# Patient Record
Sex: FEMALE | Race: WHITE | Employment: OTHER | URBAN - METROPOLITAN AREA
[De-identification: names, ages, dates, MRNs, and addresses within clinical notes are randomized per-mention and may not be internally consistent; named-entity substitution may affect disease eponyms.]

---

## 2019-06-06 RX ORDER — LEVOTHYROXINE SODIUM 137 UG/1
137 TABLET ORAL DAILY
COMMUNITY

## 2019-06-06 RX ORDER — CHOLECALCIFEROL (VITAMIN D3) 50 MCG
2000 TABLET ORAL DAILY
COMMUNITY

## 2019-06-06 RX ORDER — ASCORBIC ACID 125 MG
1 TABLET,CHEWABLE ORAL DAILY
COMMUNITY

## 2019-06-06 ASSESSMENT — MIFFLIN-ST. JEOR: SCORE: 1862.34

## 2019-06-18 ENCOUNTER — HOSPITAL ENCOUNTER (OUTPATIENT)
Facility: CLINIC | Age: 54
Discharge: HOME | End: 2019-06-19
Attending: ORTHOPAEDIC SURGERY | Admitting: ORTHOPAEDIC SURGERY
Payer: COMMERCIAL

## 2019-06-18 ENCOUNTER — ANESTHESIA EVENT (OUTPATIENT)
Dept: SURGERY | Facility: CLINIC | Age: 54
End: 2019-06-18
Payer: COMMERCIAL

## 2019-06-18 ENCOUNTER — ANESTHESIA (OUTPATIENT)
Dept: SURGERY | Facility: CLINIC | Age: 54
End: 2019-06-18
Payer: COMMERCIAL

## 2019-06-18 ENCOUNTER — APPOINTMENT (OUTPATIENT)
Dept: PHYSICAL THERAPY | Facility: CLINIC | Age: 54
End: 2019-06-18
Attending: ORTHOPAEDIC SURGERY
Payer: COMMERCIAL

## 2019-06-18 DIAGNOSIS — Z96.651 TOTAL KNEE REPLACEMENT STATUS, RIGHT: Primary | ICD-10-CM

## 2019-06-18 LAB — HCG UR QL: NEGATIVE

## 2019-06-18 PROCEDURE — 97530 THERAPEUTIC ACTIVITIES: CPT | Mod: GP

## 2019-06-18 PROCEDURE — 37000009 ZZH ANESTHESIA TECHNICAL FEE, EACH ADDTL 15 MIN: Performed by: ORTHOPAEDIC SURGERY

## 2019-06-18 PROCEDURE — 27810169 ZZH OR IMPLANT GENERAL: Performed by: ORTHOPAEDIC SURGERY

## 2019-06-18 PROCEDURE — 25800025 ZZH RX 258: Performed by: ORTHOPAEDIC SURGERY

## 2019-06-18 PROCEDURE — 25000125 ZZHC RX 250: Performed by: NURSE ANESTHETIST, CERTIFIED REGISTERED

## 2019-06-18 PROCEDURE — 25000132 ZZH RX MED GY IP 250 OP 250 PS 637: Performed by: ORTHOPAEDIC SURGERY

## 2019-06-18 PROCEDURE — 25800030 ZZH RX IP 258 OP 636: Performed by: ORTHOPAEDIC SURGERY

## 2019-06-18 PROCEDURE — 71000013 ZZH RECOVERY PHASE 1 LEVEL 1 EA ADDTL HR: Performed by: ORTHOPAEDIC SURGERY

## 2019-06-18 PROCEDURE — 81025 URINE PREGNANCY TEST: CPT | Performed by: ANESTHESIOLOGY

## 2019-06-18 PROCEDURE — 97161 PT EVAL LOW COMPLEX 20 MIN: CPT | Mod: GP

## 2019-06-18 PROCEDURE — 25000125 ZZHC RX 250: Performed by: PHYSICIAN ASSISTANT

## 2019-06-18 PROCEDURE — 27211024 ZZHC OR SUPPLY OTHER OPNP: Performed by: ORTHOPAEDIC SURGERY

## 2019-06-18 PROCEDURE — 97110 THERAPEUTIC EXERCISES: CPT | Mod: GP

## 2019-06-18 PROCEDURE — 40000306 ZZH STATISTIC PRE PROC ASSESS II: Performed by: ORTHOPAEDIC SURGERY

## 2019-06-18 PROCEDURE — 25800030 ZZH RX IP 258 OP 636: Performed by: NURSE ANESTHETIST, CERTIFIED REGISTERED

## 2019-06-18 PROCEDURE — 37000008 ZZH ANESTHESIA TECHNICAL FEE, 1ST 30 MIN: Performed by: ORTHOPAEDIC SURGERY

## 2019-06-18 PROCEDURE — 25000128 H RX IP 250 OP 636: Performed by: PHYSICIAN ASSISTANT

## 2019-06-18 PROCEDURE — 36000093 ZZH SURGERY LEVEL 4 1ST 30 MIN: Performed by: ORTHOPAEDIC SURGERY

## 2019-06-18 PROCEDURE — 71000012 ZZH RECOVERY PHASE 1 LEVEL 1 FIRST HR: Performed by: ORTHOPAEDIC SURGERY

## 2019-06-18 PROCEDURE — 36000063 ZZH SURGERY LEVEL 4 EA 15 ADDTL MIN: Performed by: ORTHOPAEDIC SURGERY

## 2019-06-18 PROCEDURE — 25000125 ZZHC RX 250: Performed by: ORTHOPAEDIC SURGERY

## 2019-06-18 PROCEDURE — 25000128 H RX IP 250 OP 636: Performed by: NURSE ANESTHETIST, CERTIFIED REGISTERED

## 2019-06-18 PROCEDURE — 25000128 H RX IP 250 OP 636: Performed by: ANESTHESIOLOGY

## 2019-06-18 PROCEDURE — 25000128 H RX IP 250 OP 636: Performed by: ORTHOPAEDIC SURGERY

## 2019-06-18 PROCEDURE — 27110028 ZZH OR GENERAL SUPPLY NON-STERILE: Performed by: ORTHOPAEDIC SURGERY

## 2019-06-18 PROCEDURE — 25800030 ZZH RX IP 258 OP 636: Performed by: ANESTHESIOLOGY

## 2019-06-18 PROCEDURE — 27210794 ZZH OR GENERAL SUPPLY STERILE: Performed by: ORTHOPAEDIC SURGERY

## 2019-06-18 PROCEDURE — C1776 JOINT DEVICE (IMPLANTABLE): HCPCS | Performed by: ORTHOPAEDIC SURGERY

## 2019-06-18 DEVICE — BONE CEMENT SIMPLEX FULL DOSE 6191-1-001: Type: IMPLANTABLE DEVICE | Site: KNEE | Status: FUNCTIONAL

## 2019-06-18 DEVICE — IMPLANTABLE DEVICE: Type: IMPLANTABLE DEVICE | Site: KNEE | Status: FUNCTIONAL

## 2019-06-18 DEVICE — BONE CEMENT SIMPLEX W/TOBRAMYCIN 6197-9-001: Type: IMPLANTABLE DEVICE | Site: KNEE | Status: FUNCTIONAL

## 2019-06-18 RX ORDER — LABETALOL 20 MG/4 ML (5 MG/ML) INTRAVENOUS SYRINGE
10
Status: DISCONTINUED | OUTPATIENT
Start: 2019-06-18 | End: 2019-06-18 | Stop reason: HOSPADM

## 2019-06-18 RX ORDER — FENTANYL CITRATE 50 UG/ML
INJECTION, SOLUTION INTRAMUSCULAR; INTRAVENOUS PRN
Status: DISCONTINUED | OUTPATIENT
Start: 2019-06-18 | End: 2019-06-18

## 2019-06-18 RX ORDER — ONDANSETRON 4 MG/1
4 TABLET, ORALLY DISINTEGRATING ORAL EVERY 6 HOURS PRN
Status: DISCONTINUED | OUTPATIENT
Start: 2019-06-18 | End: 2019-06-19 | Stop reason: HOSPADM

## 2019-06-18 RX ORDER — AMOXICILLIN 250 MG
1 CAPSULE ORAL 2 TIMES DAILY
Status: DISCONTINUED | OUTPATIENT
Start: 2019-06-18 | End: 2019-06-19 | Stop reason: HOSPADM

## 2019-06-18 RX ORDER — NALOXONE HYDROCHLORIDE 0.4 MG/ML
.1-.4 INJECTION, SOLUTION INTRAMUSCULAR; INTRAVENOUS; SUBCUTANEOUS
Status: DISCONTINUED | OUTPATIENT
Start: 2019-06-18 | End: 2019-06-19 | Stop reason: HOSPADM

## 2019-06-18 RX ORDER — GLYCOPYRROLATE 0.2 MG/ML
INJECTION, SOLUTION INTRAMUSCULAR; INTRAVENOUS PRN
Status: DISCONTINUED | OUTPATIENT
Start: 2019-06-18 | End: 2019-06-18

## 2019-06-18 RX ORDER — ROPIVACAINE HYDROCHLORIDE 7.5 MG/ML
INJECTION, SOLUTION EPIDURAL; PERINEURAL PRN
Status: DISCONTINUED | OUTPATIENT
Start: 2019-06-18 | End: 2019-06-18

## 2019-06-18 RX ORDER — NALOXONE HYDROCHLORIDE 0.4 MG/ML
.1-.4 INJECTION, SOLUTION INTRAMUSCULAR; INTRAVENOUS; SUBCUTANEOUS
Status: ACTIVE | OUTPATIENT
Start: 2019-06-18 | End: 2019-06-19

## 2019-06-18 RX ORDER — KETOROLAC TROMETHAMINE 15 MG/ML
15 INJECTION, SOLUTION INTRAMUSCULAR; INTRAVENOUS EVERY 6 HOURS PRN
Status: DISCONTINUED | OUTPATIENT
Start: 2019-06-18 | End: 2019-06-19 | Stop reason: HOSPADM

## 2019-06-18 RX ORDER — FENTANYL CITRATE 50 UG/ML
25-50 INJECTION, SOLUTION INTRAMUSCULAR; INTRAVENOUS
Status: DISCONTINUED | OUTPATIENT
Start: 2019-06-18 | End: 2019-06-18 | Stop reason: HOSPADM

## 2019-06-18 RX ORDER — MEPERIDINE HYDROCHLORIDE 50 MG/ML
12.5 INJECTION INTRAMUSCULAR; INTRAVENOUS; SUBCUTANEOUS EVERY 5 MIN PRN
Status: DISCONTINUED | OUTPATIENT
Start: 2019-06-18 | End: 2019-06-18 | Stop reason: HOSPADM

## 2019-06-18 RX ORDER — LEVOTHYROXINE SODIUM 137 UG/1
137 TABLET ORAL DAILY
Status: DISCONTINUED | OUTPATIENT
Start: 2019-06-18 | End: 2019-06-19 | Stop reason: HOSPADM

## 2019-06-18 RX ORDER — ACETAMINOPHEN 325 MG/1
650 TABLET ORAL EVERY 4 HOURS PRN
Status: DISCONTINUED | OUTPATIENT
Start: 2019-06-21 | End: 2019-06-19

## 2019-06-18 RX ORDER — HYDROXYZINE HYDROCHLORIDE 25 MG/1
25 TABLET, FILM COATED ORAL EVERY 6 HOURS PRN
Status: DISCONTINUED | OUTPATIENT
Start: 2019-06-18 | End: 2019-06-19 | Stop reason: HOSPADM

## 2019-06-18 RX ORDER — ONDANSETRON 2 MG/ML
4 INJECTION INTRAMUSCULAR; INTRAVENOUS EVERY 6 HOURS PRN
Status: DISCONTINUED | OUTPATIENT
Start: 2019-06-18 | End: 2019-06-19 | Stop reason: HOSPADM

## 2019-06-18 RX ORDER — LIDOCAINE HYDROCHLORIDE 10 MG/ML
INJECTION, SOLUTION INFILTRATION; PERINEURAL PRN
Status: DISCONTINUED | OUTPATIENT
Start: 2019-06-18 | End: 2019-06-18

## 2019-06-18 RX ORDER — ASCORBIC ACID 125 MG
TABLET,CHEWABLE ORAL DAILY
Status: DISCONTINUED | OUTPATIENT
Start: 2019-06-18 | End: 2019-06-18 | Stop reason: CLARIF

## 2019-06-18 RX ORDER — OXYCODONE HYDROCHLORIDE 5 MG/1
5-10 TABLET ORAL
Status: DISCONTINUED | OUTPATIENT
Start: 2019-06-18 | End: 2019-06-19 | Stop reason: HOSPADM

## 2019-06-18 RX ORDER — HYDROMORPHONE HYDROCHLORIDE 1 MG/ML
.3-.5 INJECTION, SOLUTION INTRAMUSCULAR; INTRAVENOUS; SUBCUTANEOUS EVERY 5 MIN PRN
Status: DISCONTINUED | OUTPATIENT
Start: 2019-06-18 | End: 2019-06-18 | Stop reason: HOSPADM

## 2019-06-18 RX ORDER — SODIUM CHLORIDE, SODIUM LACTATE, POTASSIUM CHLORIDE, CALCIUM CHLORIDE 600; 310; 30; 20 MG/100ML; MG/100ML; MG/100ML; MG/100ML
INJECTION, SOLUTION INTRAVENOUS CONTINUOUS
Status: DISCONTINUED | OUTPATIENT
Start: 2019-06-18 | End: 2019-06-18 | Stop reason: HOSPADM

## 2019-06-18 RX ORDER — ONDANSETRON 2 MG/ML
4 INJECTION INTRAMUSCULAR; INTRAVENOUS EVERY 30 MIN PRN
Status: DISCONTINUED | OUTPATIENT
Start: 2019-06-18 | End: 2019-06-18 | Stop reason: HOSPADM

## 2019-06-18 RX ORDER — CEFAZOLIN SODIUM IN 0.9 % NACL 3 G/100 ML
3 INTRAVENOUS SOLUTION, PIGGYBACK (ML) INTRAVENOUS
Status: COMPLETED | OUTPATIENT
Start: 2019-06-18 | End: 2019-06-18

## 2019-06-18 RX ORDER — DEXAMETHASONE SODIUM PHOSPHATE 4 MG/ML
INJECTION, SOLUTION INTRA-ARTICULAR; INTRALESIONAL; INTRAMUSCULAR; INTRAVENOUS; SOFT TISSUE PRN
Status: DISCONTINUED | OUTPATIENT
Start: 2019-06-18 | End: 2019-06-18

## 2019-06-18 RX ORDER — LIDOCAINE 40 MG/G
CREAM TOPICAL
Status: DISCONTINUED | OUTPATIENT
Start: 2019-06-18 | End: 2019-06-18 | Stop reason: HOSPADM

## 2019-06-18 RX ORDER — SODIUM CHLORIDE, SODIUM LACTATE, POTASSIUM CHLORIDE, CALCIUM CHLORIDE 600; 310; 30; 20 MG/100ML; MG/100ML; MG/100ML; MG/100ML
INJECTION, SOLUTION INTRAVENOUS CONTINUOUS
Status: DISCONTINUED | OUTPATIENT
Start: 2019-06-18 | End: 2019-06-19 | Stop reason: HOSPADM

## 2019-06-18 RX ORDER — PHENYLEPHRINE HYDROCHLORIDE 10 MG/ML
INJECTION INTRAVENOUS PRN
Status: DISCONTINUED | OUTPATIENT
Start: 2019-06-18 | End: 2019-06-18

## 2019-06-18 RX ORDER — CEFAZOLIN SODIUM 1 G/3ML
1 INJECTION, POWDER, FOR SOLUTION INTRAMUSCULAR; INTRAVENOUS SEE ADMIN INSTRUCTIONS
Status: DISCONTINUED | OUTPATIENT
Start: 2019-06-18 | End: 2019-06-18 | Stop reason: HOSPADM

## 2019-06-18 RX ORDER — ONDANSETRON 2 MG/ML
INJECTION INTRAMUSCULAR; INTRAVENOUS PRN
Status: DISCONTINUED | OUTPATIENT
Start: 2019-06-18 | End: 2019-06-18

## 2019-06-18 RX ORDER — LIDOCAINE 40 MG/G
CREAM TOPICAL
Status: DISCONTINUED | OUTPATIENT
Start: 2019-06-18 | End: 2019-06-19 | Stop reason: HOSPADM

## 2019-06-18 RX ORDER — ACETAMINOPHEN 325 MG/1
975 TABLET ORAL EVERY 8 HOURS
Status: DISCONTINUED | OUTPATIENT
Start: 2019-06-18 | End: 2019-06-19

## 2019-06-18 RX ORDER — HYDROMORPHONE HYDROCHLORIDE 1 MG/ML
.3-.5 INJECTION, SOLUTION INTRAMUSCULAR; INTRAVENOUS; SUBCUTANEOUS
Status: DISCONTINUED | OUTPATIENT
Start: 2019-06-18 | End: 2019-06-19 | Stop reason: HOSPADM

## 2019-06-18 RX ORDER — AMOXICILLIN 250 MG
2 CAPSULE ORAL 2 TIMES DAILY
Status: DISCONTINUED | OUTPATIENT
Start: 2019-06-18 | End: 2019-06-19 | Stop reason: HOSPADM

## 2019-06-18 RX ORDER — CEFAZOLIN SODIUM 2 G/100ML
2 INJECTION, SOLUTION INTRAVENOUS EVERY 8 HOURS
Status: DISCONTINUED | OUTPATIENT
Start: 2019-06-18 | End: 2019-06-18

## 2019-06-18 RX ORDER — CEFAZOLIN SODIUM 2 G/100ML
2 INJECTION, SOLUTION INTRAVENOUS EVERY 8 HOURS
Status: COMPLETED | OUTPATIENT
Start: 2019-06-18 | End: 2019-06-19

## 2019-06-18 RX ORDER — ONDANSETRON 4 MG/1
4 TABLET, ORALLY DISINTEGRATING ORAL EVERY 30 MIN PRN
Status: DISCONTINUED | OUTPATIENT
Start: 2019-06-18 | End: 2019-06-18 | Stop reason: HOSPADM

## 2019-06-18 RX ORDER — PROPOFOL 10 MG/ML
INJECTION, EMULSION INTRAVENOUS PRN
Status: DISCONTINUED | OUTPATIENT
Start: 2019-06-18 | End: 2019-06-18

## 2019-06-18 RX ADMIN — TRANEXAMIC ACID 1 G: 100 INJECTION, SOLUTION INTRAVENOUS at 07:44

## 2019-06-18 RX ADMIN — FENTANYL CITRATE 50 MCG: 50 INJECTION, SOLUTION INTRAMUSCULAR; INTRAVENOUS at 08:09

## 2019-06-18 RX ADMIN — HYDROMORPHONE HYDROCHLORIDE 0.5 MG: 1 INJECTION, SOLUTION INTRAMUSCULAR; INTRAVENOUS; SUBCUTANEOUS at 14:14

## 2019-06-18 RX ADMIN — GLYCOPYRROLATE 0.2 MG: 0.2 INJECTION, SOLUTION INTRAMUSCULAR; INTRAVENOUS at 07:33

## 2019-06-18 RX ADMIN — HYDROMORPHONE HYDROCHLORIDE 0.5 MG: 1 INJECTION, SOLUTION INTRAMUSCULAR; INTRAVENOUS; SUBCUTANEOUS at 20:26

## 2019-06-18 RX ADMIN — LIDOCAINE HYDROCHLORIDE 30 MG: 10 INJECTION, SOLUTION INFILTRATION; PERINEURAL at 07:33

## 2019-06-18 RX ADMIN — PROPOFOL 200 MG: 10 INJECTION, EMULSION INTRAVENOUS at 07:33

## 2019-06-18 RX ADMIN — FENTANYL CITRATE 50 MCG: 50 INJECTION, SOLUTION INTRAMUSCULAR; INTRAVENOUS at 08:02

## 2019-06-18 RX ADMIN — FENTANYL CITRATE 50 MCG: 50 INJECTION INTRAMUSCULAR; INTRAVENOUS at 10:24

## 2019-06-18 RX ADMIN — OXYCODONE HYDROCHLORIDE 5 MG: 5 TABLET ORAL at 18:30

## 2019-06-18 RX ADMIN — OXYCODONE HYDROCHLORIDE 5 MG: 5 TABLET ORAL at 21:39

## 2019-06-18 RX ADMIN — PROPOFOL 50 MG: 10 INJECTION, EMULSION INTRAVENOUS at 08:10

## 2019-06-18 RX ADMIN — PHENYLEPHRINE HYDROCHLORIDE 100 MCG: 10 INJECTION INTRAVENOUS at 08:59

## 2019-06-18 RX ADMIN — CEFAZOLIN 1 G: 1 INJECTION, POWDER, FOR SOLUTION INTRAMUSCULAR; INTRAVENOUS at 09:36

## 2019-06-18 RX ADMIN — FENTANYL CITRATE 50 MCG: 50 INJECTION, SOLUTION INTRAMUSCULAR; INTRAVENOUS at 08:08

## 2019-06-18 RX ADMIN — SENNOSIDES AND DOCUSATE SODIUM 1 TABLET: 8.6; 5 TABLET ORAL at 20:28

## 2019-06-18 RX ADMIN — DEXAMETHASONE SODIUM PHOSPHATE 8 MG: 4 INJECTION, SOLUTION INTRA-ARTICULAR; INTRALESIONAL; INTRAMUSCULAR; INTRAVENOUS; SOFT TISSUE at 07:33

## 2019-06-18 RX ADMIN — TRANEXAMIC ACID 1 G: 100 INJECTION, SOLUTION INTRAVENOUS at 09:59

## 2019-06-18 RX ADMIN — PHENYLEPHRINE HYDROCHLORIDE 100 MCG: 10 INJECTION INTRAVENOUS at 07:49

## 2019-06-18 RX ADMIN — HYDROMORPHONE HYDROCHLORIDE 1 MG: 1 INJECTION, SOLUTION INTRAMUSCULAR; INTRAVENOUS; SUBCUTANEOUS at 08:24

## 2019-06-18 RX ADMIN — CEFAZOLIN SODIUM 2 G: 2 INJECTION, SOLUTION INTRAVENOUS at 18:30

## 2019-06-18 RX ADMIN — HYDROMORPHONE HYDROCHLORIDE 0.5 MG: 1 INJECTION, SOLUTION INTRAMUSCULAR; INTRAVENOUS; SUBCUTANEOUS at 08:39

## 2019-06-18 RX ADMIN — ONDANSETRON 4 MG: 2 INJECTION INTRAMUSCULAR; INTRAVENOUS at 09:33

## 2019-06-18 RX ADMIN — SODIUM CHLORIDE, POTASSIUM CHLORIDE, SODIUM LACTATE AND CALCIUM CHLORIDE: 600; 310; 30; 20 INJECTION, SOLUTION INTRAVENOUS at 06:30

## 2019-06-18 RX ADMIN — FENTANYL CITRATE 100 MCG: 50 INJECTION, SOLUTION INTRAMUSCULAR; INTRAVENOUS at 07:33

## 2019-06-18 RX ADMIN — PHENYLEPHRINE HYDROCHLORIDE 100 MCG: 10 INJECTION INTRAVENOUS at 07:57

## 2019-06-18 RX ADMIN — PHENYLEPHRINE HYDROCHLORIDE 100 MCG: 10 INJECTION INTRAVENOUS at 08:47

## 2019-06-18 RX ADMIN — SODIUM CHLORIDE, POTASSIUM CHLORIDE, SODIUM LACTATE AND CALCIUM CHLORIDE: 600; 310; 30; 20 INJECTION, SOLUTION INTRAVENOUS at 14:27

## 2019-06-18 RX ADMIN — FENTANYL CITRATE 50 MCG: 50 INJECTION INTRAMUSCULAR; INTRAVENOUS at 10:35

## 2019-06-18 RX ADMIN — SODIUM CHLORIDE, POTASSIUM CHLORIDE, SODIUM LACTATE AND CALCIUM CHLORIDE: 600; 310; 30; 20 INJECTION, SOLUTION INTRAVENOUS at 09:21

## 2019-06-18 RX ADMIN — HYDROMORPHONE HYDROCHLORIDE 0.5 MG: 1 INJECTION, SOLUTION INTRAMUSCULAR; INTRAVENOUS; SUBCUTANEOUS at 10:55

## 2019-06-18 RX ADMIN — Medication 3 G: at 07:36

## 2019-06-18 RX ADMIN — HYDROMORPHONE HYDROCHLORIDE 0.5 MG: 1 INJECTION, SOLUTION INTRAMUSCULAR; INTRAVENOUS; SUBCUTANEOUS at 10:25

## 2019-06-18 RX ADMIN — HYDROMORPHONE HYDROCHLORIDE 0.5 MG: 1 INJECTION, SOLUTION INTRAMUSCULAR; INTRAVENOUS; SUBCUTANEOUS at 08:32

## 2019-06-18 RX ADMIN — PHENYLEPHRINE HYDROCHLORIDE 100 MCG: 10 INJECTION INTRAVENOUS at 07:44

## 2019-06-18 ASSESSMENT — MIFFLIN-ST. JEOR: SCORE: 1862.34

## 2019-06-18 NOTE — PLAN OF CARE
Discharge Planner PT   Patient plan for discharge: home with oP PT   Current status:      Eval complete, treatment indicated. Edu PT role and POC. SO presenta nd supportive. Sats stable, BP stable. HR dilcia, RN aware. Pt denied dizziness.     Pt engaged in supine TKA exercises. R knee ROM 5-60*. Good quad activation. Pt able to perform SLR.     KI donned for OOB mobility as pt continues to be sleepy this PM. Supine > sit CGA. Pt sat EOB for several minut es to monitor vitals. STS with FWW and CgA.    Pt ambulated 50' with FWW and CGA, slow tasia, step to pattern, KI donned   End of session sit > supine with SBA. Pt left supinew ith CAPNO donned, HR 47-60, RN aware, O2 sats stable. All needs in reach. Ice to R knee    Barriers to return to prior living situation: A x 1, stairs, weakness  Recommendations for discharge: home with assist from spouse, OP PT   Rationale for recommendations: predict pt will progress to modified IND with functional mobility and be able to safely negotiate stairs prior to discharge to home. SO to assist with stairs negotiation. OP PT to progress ROM, strength, gait, balance          Entered by: Caterina Vu 06/18/2019 4:44 PM

## 2019-06-18 NOTE — PLAN OF CARE
Pt arrived from pacu on a cart, assisted into bed with slider board. IV infusing into left arm, dawson cath in place and patent, O2 on at 3L NC, piggybacked into capno. Right leg ace wrapped which was clean and dry, immobilizer in place with ice tucked into it. Very sedated, falling asleep when being talked to and when trying to do postop exercises.  in room with pt and helping remind her to breath, cough and do ankle pumps with each VS check. Oriented to room, equipment, orders and routines. Pt informed she has PT scheduled for 400pm today.

## 2019-06-18 NOTE — ANESTHESIA CARE TRANSFER NOTE
Patient: Rachel Stone    Procedure(s):  Right total knee arthroplasty    Diagnosis: Medial DJD  Diagnosis Additional Information: No value filed.    Anesthesia Type:   General, Periph. Nerve Block for postop pain     Note:  Airway :Face Mask  Patient transferred to:PACU  Comments: Patient with spontaneous respirations and adequate tidal volumes. Patient awake and responsive. LMA removed in OR to 8 L face tent. To PACU ventilating well. VSS. Report given.Handoff Report: Identifed the Patient, Identified the Reponsible Provider, Reviewed the pertinent medical history, Discussed the surgical course, Reviewed Intra-OP anesthesia mangement and issues during anesthesia, Set expectations for post-procedure period and Allowed opportunity for questions and acknowledgement of understanding      Vitals: (Last set prior to Anesthesia Care Transfer)    CRNA VITALS  6/18/2019 0942 - 6/18/2019 1018      6/18/2019             Pulse:  90    SpO2:  94 %                Electronically Signed By: GUILLERMINA Mcgregor CRNA  June 18, 2019  10:18 AM

## 2019-06-18 NOTE — BRIEF OP NOTE
[facility]    Brief Operative Note    Pre-operative diagnosis: Medial DJD  Post-operative diagnosis * No post-op diagnosis entered *  Procedure: Procedure(s):  Right total knee arthroplasty  Surgeon: Surgeon(s) and Role:     * Mary Núñez MD - Primary     * Pablo Colby PA-C - Assisting  Anesthesia: * No anesthesia type entered *   Estimated blood loss: Less than 10 ml  Drains: None  Specimens: * No specimens in log *  Findings:   None.  Complications: None.  Implants:    Implant Name Type Inv. Item Serial No.  Lot No. LRB No. Used   BONE CEMENT SIMPLEX FULL DOSE 6191-1-001 Cement, Bone BONE CEMENT SIMPLEX FULL DOSE 6191-1-001  [employer] [id number] Right 1   BONE CEMENT SIMPLEX W/TOBRAMYCIN 6197-9-001 Cement, Bone BONE CEMENT SIMPLEX W/TOBRAMYCIN 6197-9-001  [employer] [id number] Right 1   Tibial base rotating platform, size 6 cemented Total Joint Component/Insert   Depuy [id number] Right 1   Femoral posterior stabilized narrow, size 6N right cemented Total Joint Component/Insert   Depuy [id number] Right 1   Patella medialized dome, 38mm cemented AOX Total Joint Component/Insert   Depuy [id number] Right 1   Tibial insert, rotating platform, posterior stabilized, size 6, 10mm AOX Total Joint Component/Insert   Depuy [id number] Right 1

## 2019-06-18 NOTE — ANESTHESIA PROCEDURE NOTES
Peripheral nerve/Neuraxial procedure note : Femoral (via adductor canal)  Pre-Procedure  Performed by Anshu Meeks MD  Referred by SOFÍA  Location: pre-op    Procedure Times:6/18/2019 7:03 AM and 6/18/2019 7:12 AM  Pre-Anesthestic Checklist: patient identified, IV checked, site marked, risks and benefits discussed, informed consent, monitors and equipment checked, pre-op evaluation, at physician/surgeon's request and post-op pain management    Timeout  Correct Patient: Yes   Correct Procedure: Yes   Correct Site: Yes   Correct Laterality: Yes   Correct Position: Yes   Site Marked: Yes   .   Procedure Documentation    .    Procedure:  right  Femoral (via adductor canal).     Ultrasound used to identify targeted nerve, plexus, or vascular marker and placed a needle adjacent to it., Ultrasound was used to visualize the spread of the anesthetic in close proximity to the above stated nerve.   Patient Prep;mask, sterile gloves, povidone-iodine 7.5% surgical scrub.  .  Needle: insulated Needle Gauge: 22.    Needle Length (Inches) 2  Insertion Method: Single Shot.       Assessment/Narrative  Paresthesias: No.  Injection made incrementally with aspirations every 5 mL..  The placement was negative for: blood aspirated, painful injection and site bleeding.  Bolus given via needle..   Secured via.   Complications: none. Test dose of mL at. Test dose negative for signs of intravascular, subdural or intrathecal injection. Comments:  The surgeon has given a verbal order transferring care of this patient to me for the performance of a regional analgesia block for post-op pain control. It is requested of me because I am uniquely trained and qualified to perform this block and the surgeon is neither trained nor qualified to perform this procedure.    30 ml 0.5% bupivacaine with 1:200k epi

## 2019-06-18 NOTE — OP NOTE
Procedure Date: 06/18/2019      PREOPERATIVE DIAGNOSES:  Osteoarthritis of the right knee and morbid obesity, with a body mass index of 42.      POSTOPERATIVE DIAGNOSES:  Osteoarthritis of the right knee and morbid obesity, with a body mass index of 42.      PROCEDURES:  Complex right total knee arthroplasty using the DePuy Attune knee system, posterior stabilized rotating platform, #6 narrow femur, #6 tibia, 38 mm patellar button and 10 mm of tibial bone graft.      INDICATIONS FOR PROCEDURE:  The patient is a 54-year-old female who has a long history of osteoarthritis of her right knee.  Also has a history of morbid obesity, with a BMI of 42.  The patient has been getting injection treatment with good short-term relief; however, these have become decreasingly effective.  I saw the patient in consultation and discussed treatment options with her.  I explained to her the risks, benefits, potential complications of total knee arthroplasty.  This discussion included, but was not specific to infection, vascular neurologic complications, DVT, septic and aseptic loosening, arthrofibrosis, fracture and the possible need for further revision surgery.  After this discussion, the patient wanted to proceed with surgery.        PRIMARY ASSISTANT:  Mikey Colby PA-C; secondary scrub was used to help expose the knee.  We used the large abdominal retractors.  We had to put expanders on the bed in order to help safely hold the patient on the operating table.        ANESTHESIA:  General.      DESCRIPTION OF PROCEDURE:  The patient was taken to the operating room, placed on the operative table in supine position.  After adequate induction of a general anesthetic, a pneumatic tourniquet was applied to the right thigh.  A bump was placed beneath the right hip.  The patient was given 3 grams of Ancef for infection prophylaxis given 1 hour prior to incision.  We then performed a sterile prep and drape of the right knee and right lower  extremity.  After sterile prep and drape, the limb was exsanguinated, tourniquet was elevated to 300 mmHg.  I then made a midline incision exposing the extensor mechanism and proceeded with a medial parapatellar arthrotomy, quad splitting approach.  I inspected the 3 compartments of the knee and she had tricompartment arthritis.  So at this point, I decided to proceed with a total knee arthroplasty.  We then identified the entry point for the intramedullary guide of the femur, set it at 5 degrees of valgus and made my distal femoral cut.  We then sized the femur appropriately and applied the jig for 3 degrees of external rotation of the femoral component.  We then made our anterior and posterior cuts along with anterior and posterior chamfers.  We then directed our attention to the tibia.  Using extramedullary guide to establish a neutral cut of the base off of the efficient medial side of the tibia.  In order to correct the varus deformity, a release was done off the medial side of the tibia.  Once the tibial cut was made, we checked our flexion, extension gaps and found them to be equal.  We then finished preparation of the femur by making a box cut and finished the preparation of the tibia.  We then sized the patella, made our patellar cuts, finished preparation of the patella.  We then removed the posterior osteophytes with an osteotome and a mallet and injected the posterior capsule with a mixture of ropivacaine and Toradol.  We then began preparing the cement surfaces using pulsatile jet lavage with antibiotic saline.  Once the cement was of appropriate consistency, we cemented first the tibial component followed by the femoral component.  Once these components were fully seated, excess cement was removed using a [place] elevator.  We then placed the knee in full extension with trial polyethylene in place and allowed the cement to harden.  We then cemented the patella, again removed any excess cement using a  [place] elevator.  While the cement was hardening, we soaked the knee in dilute solution of Betadine for 3 minutes.  We irrigated again using pulsatile jet lavage, used approximately 3 liters of antibiotic saline in the pulsatile jet lavage.  Once the cement had hardened, took the knee through a range of motion.  Patella tracked ideally.  We had good soft tissue balance in flexion and extension.  We then removed the trial polyethylene, inspected the joint for loose bone cement and removed it when it was found.  We then irrigated again using the pulsatile jet lavage and put in the actual rotating platform tibial polyethylene, reduced the knee, again took the knee through a range of motion.  Patella tracked ideally.  We then placed the knee in approximately 30 degrees of flexion and closed the arthrotomy using 0 Ethibond sutures.  Prior to closure, we did irrigate using pulsatile jet lavage.  The 0 Ethibond closure was then oversewn with 0 Stratafix.  The deep subQ was then closed using multiple layers using 0 Vicryl, superficial was closed in multiple layers using 2-0 Vicryl, skin was closed with a running 3-0 Monocryl subcuticular stitch followed by Prineo dressing.  Sterile dressing was applied followed by a knee immobilizer.  The patient tolerated the operative procedure.  There were no intraoperative complications.  The patient went to the PAR in stable condition.  Plan will be for the patient to get 24 hours of Ancef for infection prophylaxis, 30 days of aspirin for DVT prophylaxis.         FINA GORE MD             D: 2019   T: 2019   MT: URBANO      Name:     LEE NAIDU   MRN:      [id number]        Account:        [id number]   :      1965           Procedure Date: 2019      Document: [id number]

## 2019-06-18 NOTE — ANESTHESIA POSTPROCEDURE EVALUATION
Patient: Rachel Stone    Procedure(s):  Right total knee arthroplasty    Diagnosis:Medial DJD  Diagnosis Additional Information: No value filed.    Anesthesia Type:  General, Periph. Nerve Block for postop pain    Note:  Anesthesia Post Evaluation    Patient location during evaluation: PACU  Patient participation: Able to participate in evaluation but full recovery from regional anesthesia has not yet ocurrred but is anticipated to occur within 48 hours  Level of consciousness: awake and alert  Pain management: adequate  Airway patency: patent  Cardiovascular status: acceptable  Respiratory status: acceptable  Hydration status: acceptable  PONV: none     Anesthetic complications: None          Last vitals:  Vitals:    06/18/19 1110 06/18/19 1115 06/18/19 1133   BP:   (P) 107/41   Pulse:      Resp: 10 12 (P) 10   Temp:   (P) 96  F (35.6  C)   SpO2:   (P) 92%         Electronically Signed By: Anshu Meeks MD  June 18, 2019  11:43 AM

## 2019-06-18 NOTE — ANESTHESIA PREPROCEDURE EVALUATION
Anesthesia Pre-Procedure Evaluation    Patient: Rachel Stone   MRN: [id number] : 1965          Preoperative Diagnosis: Medial DJD    Procedure(s):  Right unicompartmental knee arthroplasty versus total knee arthroplasty    Past Medical History:   Diagnosis Date     Arthritis     OA knee     Thyroid disease     hypothyroidism     Past Surgical History:   Procedure Laterality Date     ENT SURGERY      tonsillectomy as a child     GYN SURGERY      C/S     HEAD & NECK SURGERY      wisdom teeth removed age 21     ORTHOPEDIC SURGERY Right 2014    meniscus repair knee arthroscopy     Anesthesia Evaluation     . Pt has had prior anesthetic. Type: General    No history of anesthetic complications          ROS/MED HX    ENT/Pulmonary:  - neg pulmonary ROS     Neurologic:  - neg neurologic ROS     Cardiovascular:     (+) hypertension----. : . . . :. .       METS/Exercise Tolerance:     Hematologic:  - neg hematologic  ROS       Musculoskeletal:   (+) arthritis,  -       GI/Hepatic:  - neg GI/hepatic ROS       Renal/Genitourinary:  - ROS Renal section negative       Endo:     (+) thyroid problem hypothyroidism, Obesity, .      Psychiatric:  - neg psychiatric ROS       Infectious Disease:  - neg infectious disease ROS       Malignancy:         Other:    - neg other ROS                      Physical Exam  Normal systems: cardiovascular, pulmonary and dental    Airway   Mallampati: II  TM distance: >3 FB  Neck ROM: full    Dental     Cardiovascular       Pulmonary             No results found for: WBC, HGB, HCT, PLT, CRP, SED, NA, POTASSIUM, CHLORIDE, CO2, BUN, CR, GLC, JERRELL, PHOS, MAG, ALBUMIN, PROTTOTAL, ALT, AST, GGT, ALKPHOS, BILITOTAL, BILIDIRECT, LIPASE, AMYLASE, FEDE, PTT, INR, FIBR, TSH, T4, T3, HCG, HCGS, CKTOTAL, CKMB, TROPN    Preop Vitals  BP Readings from Last 3 Encounters:   19 123/70    Pulse Readings from Last 3 Encounters:   19 71      Resp Readings from Last 3 Encounters:   19 18     "SpO2 Readings from Last 3 Encounters:   06/18/19 98%      Temp Readings from Last 1 Encounters:   06/18/19 97.7  F (36.5  C) (Temporal)    Ht Readings from Last 1 Encounters:   06/18/19 1.702 m (5' 7\")      Wt Readings from Last 1 Encounters:   06/18/19 122.5 kg (270 lb)    Estimated body mass index is 42.29 kg/m  as calculated from the following:    Height as of this encounter: 1.702 m (5' 7\").    Weight as of this encounter: 122.5 kg (270 lb).       Anesthesia Plan      History & Physical Review  History and physical reviewed and following examination; no interval change.    ASA Status:  3 .    NPO Status:  > 8 hours    Plan for General and Periph. Nerve Block for postop pain with Intravenous induction. Maintenance will be Balanced.    PONV prophylaxis:  Ondansetron (or other 5HT-3) and Dexamethasone or Solumedrol       Postoperative Care  Postoperative pain management:  IV analgesics, Oral pain medications and Multi-modal analgesia.      Consents  Anesthetic plan, risks, benefits and alternatives discussed with:  Patient..                 Anshu Meeks MD                    .  "

## 2019-06-18 NOTE — PHARMACY-ADMISSION MEDICATION HISTORY
Medication reconciliation interview completed by pre-admitting nurse Shira Ryan, reviewed by pharmacy. No further clarifications needed.       Prior to Admission medications    Medication Sig Last Dose Taking? Auth Provider   Cholecalciferol (VITAMIN D3) 2000 units TABS Take by mouth daily  Yes Reported, Patient   levothyroxine (SYNTHROID/LEVOTHROID) 137 MCG tablet Take 137 mcg by mouth daily  Yes Reported, Patient   Menaquinone-7 (VITAMIN K2) 100 MCG CAPS Take by mouth daily  Yes Reported, Patient

## 2019-06-18 NOTE — PROGRESS NOTES
06/18/19 1636   Quick Adds   Type of Visit Initial PT Evaluation   Living Environment   Lives With spouse   Living Arrangements house   Living Environment Comment 12 steps to bedroom, railing present. Has basement, does not need to access. Tub shower   Self-Care   Usual Activity Tolerance good   Current Activity Tolerance fair   Regular Exercise No   Equipment Currently Used at Home none   Activity/Exercise/Self-Care Comment IND   Functional Level Prior   Ambulation 0-->independent   Transferring 0-->independent   Toileting 0-->independent   Bathing 0-->independent   Communication 0-->understands/communicates without difficulty   Swallowing 0-->swallows foods/liquids without difficulty   Fall history within last six months no   Prior Functional Level Comment IND   General Information   Onset of Illness/Injury or Date of Surgery - Date 06/18/19   Referring Physician Mary Núñez MD   Pertinent History of Current Problem (include personal factors and/or comorbidities that impact the POC) POD # 0 R TKA   Precautions/Limitations fall precautions   General Observations Pt somewhat lethargic start of session, noted pt with dilcia HR, RN aware, O2 sats stable during session    General Info Comments SO present and supportive   Cognitive Status Examination   Orientation orientation to person, place and time   Cognitive Comment Pt sleepy however able to participate in session    Pain Assessment   Patient Currently in Pain Yes, see Vital Sign flowsheet   Posture    Posture Forward head position   Range of Motion (ROM)   ROM Comment R knee AROM 0-60* supine    Strength   Strength Comments Good antigravity strength RLE, pt able to perform SLR no quad lag    Bed Mobility   Bed Mobility Comments supine > sit with cGA   Transfer Skills   Transfer Comments STS with FWW and close CGA KI donned   Gait   Gait Comments Pt ambulated 50' with FWW and CGA, slow tasia, step to pattern, KI donned   Balance   Balance Comments Pt  "requires BuE support on FWw to maintain standing balance   Sensory Examination   Sensory Perception Comments Intac t to light touch    General Therapy Interventions   Planned Therapy Interventions balance training;bed mobility training;gait training;neuromuscular re-education;strengthening;transfer training   Clinical Impression   Criteria for Skilled Therapeutic Intervention yes, treatment indicated   PT Diagnosis impaired functional mobility from baseline   Influenced by the following impairments POD # 0 R TKA, pain, weakness    Functional limitations due to impairments see above   Clinical Presentation Stable/Uncomplicated   Clinical Presentation Rationale age, good family support, mobilizing well   Clinical Decision Making (Complexity) Low complexity   Therapy Frequency 2x/day   Predicted Duration of Therapy Intervention (days/wks) 3 days   Anticipated Discharge Disposition Home with Outpatient Therapy   Risk & Benefits of therapy have been explained Yes   Patient, Family & other staff in agreement with plan of care Yes   [facility] AM-PAC  \"6 Clicks\" V.2 Basic Mobility Inpatient Short Form   1. Turning from your back to your side while in a flat bed without using bedrails? 3 - A Little   2. Moving from lying on your back to sitting on the side of a flat bed without using bedrails? 3 - A Little   3. Moving to and from a bed to a chair (including a wheelchair)? 3 - A Little   4. Standing up from a chair using your arms (e.g., wheelchair, or bedside chair)? 3 - A Little   5. To walk in hospital room? 3 - A Little   6. Climbing 3-5 steps with a railing? 3 - A Little   Basic Mobility Raw Score (Score out of 24.Lower scores equate to lower levels of function) 18   Total Evaluation Time   Total Evaluation Time (Minutes) 18     "

## 2019-06-19 ENCOUNTER — APPOINTMENT (OUTPATIENT)
Dept: OCCUPATIONAL THERAPY | Facility: CLINIC | Age: 54
End: 2019-06-19
Attending: ORTHOPAEDIC SURGERY
Payer: COMMERCIAL

## 2019-06-19 ENCOUNTER — APPOINTMENT (OUTPATIENT)
Dept: PHYSICAL THERAPY | Facility: CLINIC | Age: 54
End: 2019-06-19
Attending: ORTHOPAEDIC SURGERY
Payer: COMMERCIAL

## 2019-06-19 VITALS
RESPIRATION RATE: 14 BRPM | SYSTOLIC BLOOD PRESSURE: 121 MMHG | WEIGHT: 270 LBS | HEART RATE: 59 BPM | TEMPERATURE: 97.5 F | HEIGHT: 67 IN | BODY MASS INDEX: 42.38 KG/M2 | DIASTOLIC BLOOD PRESSURE: 52 MMHG | OXYGEN SATURATION: 100 %

## 2019-06-19 LAB
GLUCOSE SERPL-MCNC: 141 MG/DL (ref 70–99)
HGB BLD-MCNC: 10.4 G/DL (ref 11.7–15.7)

## 2019-06-19 PROCEDURE — 97110 THERAPEUTIC EXERCISES: CPT | Mod: GP | Performed by: PHYSICAL THERAPY ASSISTANT

## 2019-06-19 PROCEDURE — 85018 HEMOGLOBIN: CPT | Performed by: ORTHOPAEDIC SURGERY

## 2019-06-19 PROCEDURE — 97530 THERAPEUTIC ACTIVITIES: CPT | Mod: GP | Performed by: PHYSICAL THERAPY ASSISTANT

## 2019-06-19 PROCEDURE — 97165 OT EVAL LOW COMPLEX 30 MIN: CPT | Mod: GO | Performed by: STUDENT IN AN ORGANIZED HEALTH CARE EDUCATION/TRAINING PROGRAM

## 2019-06-19 PROCEDURE — 97535 SELF CARE MNGMENT TRAINING: CPT | Mod: GO,59 | Performed by: STUDENT IN AN ORGANIZED HEALTH CARE EDUCATION/TRAINING PROGRAM

## 2019-06-19 PROCEDURE — 97116 GAIT TRAINING THERAPY: CPT | Mod: GP | Performed by: PHYSICAL THERAPY ASSISTANT

## 2019-06-19 PROCEDURE — 25000128 H RX IP 250 OP 636: Performed by: ORTHOPAEDIC SURGERY

## 2019-06-19 PROCEDURE — 25000132 ZZH RX MED GY IP 250 OP 250 PS 637: Performed by: ORTHOPAEDIC SURGERY

## 2019-06-19 PROCEDURE — 82947 ASSAY GLUCOSE BLOOD QUANT: CPT | Performed by: ORTHOPAEDIC SURGERY

## 2019-06-19 PROCEDURE — 36415 COLL VENOUS BLD VENIPUNCTURE: CPT | Performed by: ORTHOPAEDIC SURGERY

## 2019-06-19 RX ORDER — ONDANSETRON 4 MG/1
4 TABLET, ORALLY DISINTEGRATING ORAL EVERY 6 HOURS PRN
Qty: 30 TABLET | Refills: 0 | Status: ON HOLD | OUTPATIENT
Start: 2019-06-19 | End: 2019-11-20

## 2019-06-19 RX ORDER — HYDROXYZINE HYDROCHLORIDE 25 MG/1
25 TABLET, FILM COATED ORAL EVERY 6 HOURS PRN
Qty: 30 TABLET | Refills: 0 | Status: ON HOLD | OUTPATIENT
Start: 2019-06-19 | End: 2019-11-20

## 2019-06-19 RX ORDER — ASPIRIN 325 MG
325 TABLET, DELAYED RELEASE (ENTERIC COATED) ORAL DAILY
Qty: 30 TABLET | Refills: 0 | Status: ON HOLD | OUTPATIENT
Start: 2019-06-20 | End: 2019-11-20

## 2019-06-19 RX ORDER — AMOXICILLIN 250 MG
1 CAPSULE ORAL 2 TIMES DAILY
Qty: 30 TABLET | Refills: 0 | Status: ON HOLD | OUTPATIENT
Start: 2019-06-19 | End: 2019-11-20

## 2019-06-19 RX ORDER — OXYCODONE HYDROCHLORIDE 5 MG/1
5-10 TABLET ORAL
Qty: 30 TABLET | Refills: 0 | Status: ON HOLD | OUTPATIENT
Start: 2019-06-19 | End: 2019-11-20

## 2019-06-19 RX ADMIN — HYDROXYZINE HYDROCHLORIDE 25 MG: 25 TABLET ORAL at 09:03

## 2019-06-19 RX ADMIN — SENNOSIDES AND DOCUSATE SODIUM 1 TABLET: 8.6; 5 TABLET ORAL at 07:49

## 2019-06-19 RX ADMIN — LEVOTHYROXINE SODIUM 137 MCG: 137 TABLET ORAL at 07:31

## 2019-06-19 RX ADMIN — VITAMIN D, TAB 1000IU (100/BT) 2000 UNITS: 25 TAB at 07:49

## 2019-06-19 RX ADMIN — CEFAZOLIN SODIUM 2 G: 2 INJECTION, SOLUTION INTRAVENOUS at 02:30

## 2019-06-19 RX ADMIN — OXYCODONE HYDROCHLORIDE 5 MG: 5 TABLET ORAL at 09:03

## 2019-06-19 RX ADMIN — OXYCODONE HYDROCHLORIDE 5 MG: 5 TABLET ORAL at 02:35

## 2019-06-19 RX ADMIN — ASPIRIN 325 MG: 325 TABLET, DELAYED RELEASE ORAL at 07:49

## 2019-06-19 RX ADMIN — KETOROLAC TROMETHAMINE 15 MG: 15 INJECTION, SOLUTION INTRAMUSCULAR; INTRAVENOUS at 04:00

## 2019-06-19 RX ADMIN — OXYCODONE HYDROCHLORIDE 10 MG: 5 TABLET ORAL at 11:50

## 2019-06-19 RX ADMIN — OXYCODONE HYDROCHLORIDE 5 MG: 5 TABLET ORAL at 06:09

## 2019-06-19 ASSESSMENT — ACTIVITIES OF DAILY LIVING (ADL): PREVIOUS_RESPONSIBILITIES: MEAL PREP;HOUSEKEEPING;LAUNDRY;SHOPPING;FINANCES;DRIVING

## 2019-06-19 NOTE — PLAN OF CARE
OT: Evaluation completed, treatment initiated, POD#1 R TKA      Discharge Planner OT   Patient plan for discharge: home today  Current status: Pt able to complete total body dressing tasks seated and standing from chair, SBA following instructions for compensatory techniques; pt able to complete sit<>stand transfers with FWW, SBA and mobility within room with FWW, SBA, at times demonstrated poor FWW safety and impulsivity, however receptive to instructions for improved safety; pt completed toilet transfer from comfort height toilet, FWW, SBA following instructions to simulate home set-up  Barriers to return to prior living situation: none anticipated  Recommendations for discharge: home with family, may benefit from DME such as RTS and shower chair  Rationale for recommendations: pt demonstrated safety with self cares including dressing tasks and elevated toilet transfer, pt declined tub or standard height toilet transfer, if remains in hospital could complete transfers otherwise adequate for discharge home with possible DME and family assist       Entered by: Yvonne Casillas 06/19/2019 11:21 AM

## 2019-06-19 NOTE — PROGRESS NOTES
"Rachel Stone  2019  POD # 1 sp tka    Admit Date:  2019      Doing well.  Normal healing wound.  No immediate surgical complications identified.  No excessive bleeding  Pain well-controlled.  Objective:  Blood pressure 110/42, pulse 59, temperature 97  F (36.1  C), resp. rate 15, height 1.702 m (5' 7\"), weight 122.5 kg (270 lb), last menstrual period 2019, SpO2 100 %.    Temperatures:  Current - Temp: 97  F (36.1  C); Max - Temp  Av  F (36.1  C)  Min: 96  F (35.6  C)  Max: 98  F (36.7  C)  Pulse range: Pulse  Av.4  Min: 57  Max: 89  Blood pressure range: Systolic (24hrs), Av , Min:93 , Max:127   ; Diastolic (24hrs), Av, Min:41, Max:74    Exam:  CMS: intact  alert, stable, wound ok  Calf nontender b le.       Labs:  No results for input(s): POTASSIUM in the last 33793 hours.  Recent Labs   Lab Test 19  0654   HGB 10.4*     No results for input(s): INR in the last 50784 hours.  No results for input(s): PLT in the last 18369 hours.    PLAN: Weight bearing as tolerated  Continue physical therapy  Pain control measures  Discontinue to home today.      "

## 2019-06-19 NOTE — PLAN OF CARE
Pt remains very sleepy. VSS. 2L O2 Reports Numbness to R foot. Ace wrap to R leg CDI, ice applied. Reg diet. Aguilar in place. Ambulated in xiao with PT, A1 walker, gait belt and KI. Pt states she does not do well with medication and would prefer not to take anything. Pt rating pain 4-6/10. Did request 1 oxy for pain this evening, pt tolerated well and noted decrease in pain. Pt plans to go home with  at discharge. Will continue to monitor.

## 2019-06-19 NOTE — DISCHARGE INSTRUCTIONS
Return to clinic in 10-14 days.  Call [phone] to schedule or if you experience any problems before your scheduled appointment.      1. Do exercises at home as instructed by therapist twice a day. Start outpatient therapy as ordered by your doctor.  2. Ice knee after exercises and therapy.  3. Examine dressing daily for problems.  4. May shower, can get dressing wet, no soaking (no bathtubs, pools or hot tubs)  5. Notify your dentist or physician of your implant so you can get antibiotics before any dental work or before any invasive procedure (ie: colonoscopy)  6. Remove anti-embolism stockings (Mann hose) at bedtime and wear during the day.      While on narcotic pain medication, to prevent constipation:  1. Drink plenty of water to keep well hydrated   2. May take over the counter Colace or Senna (follow instructions on label)        Call your physician if: ([phone])   1. Persistent fever greater than 101 degrees with body chills or excessive sweating.  2. Increased/persistent redness, localized warmth, tenderness, drainage or swelling at incision site. Greater than 50% drainage on dressing.   3. Persistent pain not controlled with oral pain medications, ice and rest.   4. No bowel movement in 3 days (may use Milk of Magnesia or other over the counter remedy).  5. Chest pain, shortness of breath, and/or calf pain with excessive swelling.  6. Generalized feeling of illness such as nausea/vomiting and/or lightheaded/dizziness.  7. Any other questions or concerns related to your surgery/recovery.    Thank you for allowing [facility] to participate in your cares!!

## 2019-06-19 NOTE — PLAN OF CARE
Discharge Planner PT   Patient plan for discharge: home later today  Current status: gait with RW to 125 feet stairs not tried will do in PM session wants RW issued S basic transfers  Barriers to return to prior living situation: none  Recommendations for discharge: home with assist from spouse, OP PT  per plan established by the PT.    Rationale for recommendations: anticipate will met PT goals after PM session will issueRW       Entered by: Doreen Teresa 06/19/2019 10:30 AM     PT- PM issued RW for home goals were met, recommend to PT for discharge to home with OP PT per MD plan.

## 2019-06-19 NOTE — PLAN OF CARE
A&O x4. Up w/Ax1 w/walker and KI. On 2L O2 w/capnography. BS hypoactive, denies passing flatus. Tolerating regular diet well. Aguilar drained adequate amts, removed this am. CMS intact. Saline locked this am. Right knee ace wrapped CDI. Pain managed w/PRN oxycodone, IV Dilaudid x1, and IV Toradol x1. Plans for home w/spouse at discharge.

## 2019-06-19 NOTE — DISCHARGE SUMMARY
Orthopedic Discharge Summary   Patient: Rachel Stone  Admission Date: 6/18/2019  Discharge Date: 6/19/19  Date of Service: 6/19/2019  Attending Provider: Mary Núñez MD  Admission Diagnosis: Medial DJD  Total knee replacement status, right  Total knee replacement status, right  Discharge Diagnosis: right knee osteoarthritis  Procedures Performed: right total knee replacement  Complications: None apparent   History of Present Illness: see operative report for full HPI  Past Medical History:   Past Medical History:   Diagnosis Date     Arthritis     OA knee     Thyroid disease     hypothyroidism     Patient Active Problem List   Diagnosis     Total knee replacement status, right     Past Surgical History:   Procedure Laterality Date     ENT SURGERY      tonsillectomy as a child     GYN SURGERY  2001    C/S     HEAD & NECK SURGERY      wisdom teeth removed age 21     ORTHOPEDIC SURGERY Right 2014    meniscus repair knee arthroscopy     Social History     Socioeconomic History     Marital status:      Spouse name: Not on file     Number of children: Not on file     Years of education: Not on file     Highest education level: Not on file   Occupational History     Not on file   Social Needs     Financial resource strain: Not on file     Food insecurity:     Worry: Not on file     Inability: Not on file     Transportation needs:     Medical: Not on file     Non-medical: Not on file   Tobacco Use     Smoking status: Never Smoker     Smokeless tobacco: Never Used   Substance and Sexual Activity     Alcohol use: Yes     Comment: 1-2 per month      Drug use: Never     Sexual activity: Not on file   Lifestyle     Physical activity:     Days per week: Not on file     Minutes per session: Not on file     Stress: Not on file   Relationships     Social connections:     Talks on phone: Not on file     Gets together: Not on file     Attends [employer] service: Not on file     Active member of club or organization: Not on  file     Attends meetings of clubs or organizations: Not on file     Relationship status: Not on file     Intimate partner violence:     Fear of current or ex partner: Not on file     Emotionally abused: Not on file     Physically abused: Not on file     Forced sexual activity: Not on file   Other Topics Concern     Not on file   Social History Narrative     Not on file     Medications on admission:   Medications Prior to Admission   Medication Sig Dispense Refill Last Dose     Cholecalciferol (VITAMIN D3) 2000 units TABS Take by mouth daily        levothyroxine (SYNTHROID/LEVOTHROID) 137 MCG tablet Take 137 mcg by mouth daily        Menaquinone-7 (VITAMIN K2) 100 MCG CAPS Take by mouth daily        Allergies:    Allergies   Allergen Reactions     Apple Swelling     Hives and possible swelling of throat     [place] Oil [Fish Oil] Swelling     Hives and swelling of throat     Acetaminophen Hives     Itching and hives     Ibuprofen Hives     Itching and hives       Hospital Course: Patient was admitted to Orthopedics and brought to the OR on where she underwent the above named procedure. Postoperatively she did very well with no apparent complications, and she had an uneventful hospital stay. Ancef was given for 24 hours after surgery. She was given aspirin for DVT prophylaxis and her pain was well controlled with oral medications, then transitioned to oral pain medications during her hospital stay. She progressed well with physical therapy and discharge to home was recommended. Her chronic medical problems were followed by the medicine team during her hospital stay and there were no significant changes to conditions or treatment plans. No new medical problems presented during her hospital stay.   Consultations Obtained During Hospitalization:  1. Internal medicine for management of comorbid conditions and home medication management.  2. Physical Therapy for gait training, mobilization, range of motion and  strengthening exercises  3. Occupational Therapy for activities of daily living.  4. Social Work for disposition planning.  Active Problems:    Total knee replacement status, right    Discharge Disposition: patient improving  Discharge Diet: resume normal pre op diet  Discharge Medications:   Current Discharge Medication List      START taking these medications    Details   aspirin (ASA) 325 MG EC tablet Take 1 tablet (325 mg) by mouth daily  Qty: 30 tablet, Refills: 0    Associated Diagnoses: Total knee replacement status, right      hydrOXYzine (ATARAX) 25 MG tablet Take 1 tablet (25 mg) by mouth every 6 hours as needed for itching  Qty: 30 tablet, Refills: 0    Associated Diagnoses: Total knee replacement status, right      ondansetron (ZOFRAN-ODT) 4 MG ODT tab Take 1 tablet (4 mg) by mouth every 6 hours as needed for nausea or vomiting  Qty: 30 tablet, Refills: 0    Associated Diagnoses: Total knee replacement status, right      oxyCODONE (ROXICODONE) 5 MG tablet Take 1-2 tablets (5-10 mg) by mouth every 3 hours as needed for breakthrough pain  Qty: 30 tablet, Refills: 0    Associated Diagnoses: Total knee replacement status, right      senna-docusate (SENOKOT-S/PERICOLACE) 8.6-50 MG tablet Take 1 tablet by mouth 2 times daily  Qty: 30 tablet, Refills: 0    Associated Diagnoses: Total knee replacement status, right         CONTINUE these medications which have NOT CHANGED    Details   Cholecalciferol (VITAMIN D3) 2000 units TABS Take by mouth daily      levothyroxine (SYNTHROID/LEVOTHROID) 137 MCG tablet Take 137 mcg by mouth daily      Menaquinone-7 (VITAMIN K2) 100 MCG CAPS Take by mouth daily           Code Status:   X-rays needed at the follow up visit: =  Pablo Colby PA-C  6/19/2019 10:33 AM   [facility]  [place]  [phone]  Please fax copy to [facility] at Dr. Núñez's office.

## 2019-11-20 ENCOUNTER — ANESTHESIA (OUTPATIENT)
Dept: SURGERY | Facility: CLINIC | Age: 54
End: 2019-11-20
Payer: COMMERCIAL

## 2019-11-20 ENCOUNTER — HOSPITAL ENCOUNTER (OUTPATIENT)
Facility: CLINIC | Age: 54
LOS: 1 days | Discharge: HOME | End: 2019-11-21
Attending: ORTHOPAEDIC SURGERY | Admitting: ORTHOPAEDIC SURGERY
Payer: COMMERCIAL

## 2019-11-20 ENCOUNTER — ANESTHESIA EVENT (OUTPATIENT)
Dept: SURGERY | Facility: CLINIC | Age: 54
End: 2019-11-20
Payer: COMMERCIAL

## 2019-11-20 ENCOUNTER — APPOINTMENT (OUTPATIENT)
Dept: PHYSICAL THERAPY | Facility: CLINIC | Age: 54
End: 2019-11-20
Attending: ORTHOPAEDIC SURGERY
Payer: COMMERCIAL

## 2019-11-20 DIAGNOSIS — Z96.652 S/P LEFT UNICOMPARTMENTAL KNEE REPLACEMENT: Primary | ICD-10-CM

## 2019-11-20 DIAGNOSIS — M17.12 DEGENERATIVE ARTHRITIS OF LEFT KNEE: ICD-10-CM

## 2019-11-20 LAB — HCG UR QL: NEGATIVE

## 2019-11-20 PROCEDURE — 36000063 ZZH SURGERY LEVEL 4 EA 15 ADDTL MIN: Performed by: ORTHOPAEDIC SURGERY

## 2019-11-20 PROCEDURE — 25000128 H RX IP 250 OP 636: Performed by: NURSE ANESTHETIST, CERTIFIED REGISTERED

## 2019-11-20 PROCEDURE — 25800030 ZZH RX IP 258 OP 636: Performed by: NURSE ANESTHETIST, CERTIFIED REGISTERED

## 2019-11-20 PROCEDURE — 25800030 ZZH RX IP 258 OP 636: Performed by: ORTHOPAEDIC SURGERY

## 2019-11-20 PROCEDURE — 25800030 ZZH RX IP 258 OP 636: Performed by: PHYSICIAN ASSISTANT

## 2019-11-20 PROCEDURE — 37000008 ZZH ANESTHESIA TECHNICAL FEE, 1ST 30 MIN: Performed by: ORTHOPAEDIC SURGERY

## 2019-11-20 PROCEDURE — 25800030 ZZH RX IP 258 OP 636: Performed by: ANESTHESIOLOGY

## 2019-11-20 PROCEDURE — 40000170 ZZH STATISTIC PRE-PROCEDURE ASSESSMENT II: Performed by: ORTHOPAEDIC SURGERY

## 2019-11-20 PROCEDURE — 25000566 ZZH SEVOFLURANE, EA 15 MIN: Performed by: ORTHOPAEDIC SURGERY

## 2019-11-20 PROCEDURE — 37000009 ZZH ANESTHESIA TECHNICAL FEE, EACH ADDTL 15 MIN: Performed by: ORTHOPAEDIC SURGERY

## 2019-11-20 PROCEDURE — 97110 THERAPEUTIC EXERCISES: CPT | Mod: GP

## 2019-11-20 PROCEDURE — 25800025 ZZH RX 258: Performed by: ORTHOPAEDIC SURGERY

## 2019-11-20 PROCEDURE — 97161 PT EVAL LOW COMPLEX 20 MIN: CPT | Mod: GP

## 2019-11-20 PROCEDURE — 25000128 H RX IP 250 OP 636: Performed by: PHYSICIAN ASSISTANT

## 2019-11-20 PROCEDURE — 36000093 ZZH SURGERY LEVEL 4 1ST 30 MIN: Performed by: ORTHOPAEDIC SURGERY

## 2019-11-20 PROCEDURE — 25000132 ZZH RX MED GY IP 250 OP 250 PS 637: Performed by: PHYSICIAN ASSISTANT

## 2019-11-20 PROCEDURE — 25000128 H RX IP 250 OP 636: Performed by: ANESTHESIOLOGY

## 2019-11-20 PROCEDURE — C1776 JOINT DEVICE (IMPLANTABLE): HCPCS | Performed by: ORTHOPAEDIC SURGERY

## 2019-11-20 PROCEDURE — 27110028 ZZH OR GENERAL SUPPLY NON-STERILE: Performed by: ORTHOPAEDIC SURGERY

## 2019-11-20 PROCEDURE — 27210794 ZZH OR GENERAL SUPPLY STERILE: Performed by: ORTHOPAEDIC SURGERY

## 2019-11-20 PROCEDURE — 25000125 ZZHC RX 250: Performed by: NURSE ANESTHETIST, CERTIFIED REGISTERED

## 2019-11-20 PROCEDURE — 97116 GAIT TRAINING THERAPY: CPT | Mod: GP

## 2019-11-20 PROCEDURE — 25000125 ZZHC RX 250: Performed by: PHYSICIAN ASSISTANT

## 2019-11-20 PROCEDURE — 71000012 ZZH RECOVERY PHASE 1 LEVEL 1 FIRST HR: Performed by: ORTHOPAEDIC SURGERY

## 2019-11-20 PROCEDURE — 25000128 H RX IP 250 OP 636: Performed by: ORTHOPAEDIC SURGERY

## 2019-11-20 PROCEDURE — 27810169 ZZH OR IMPLANT GENERAL: Performed by: ORTHOPAEDIC SURGERY

## 2019-11-20 PROCEDURE — 81025 URINE PREGNANCY TEST: CPT | Performed by: ANESTHESIOLOGY

## 2019-11-20 DEVICE — IMPLANTABLE DEVICE: Type: IMPLANTABLE DEVICE | Site: KNEE | Status: FUNCTIONAL

## 2019-11-20 DEVICE — BONE CEMENT SIMPLEX W/TOBRAMYCIN 6197-9-001: Type: IMPLANTABLE DEVICE | Site: KNEE | Status: FUNCTIONAL

## 2019-11-20 RX ORDER — SODIUM CHLORIDE, SODIUM LACTATE, POTASSIUM CHLORIDE, CALCIUM CHLORIDE 600; 310; 30; 20 MG/100ML; MG/100ML; MG/100ML; MG/100ML
INJECTION, SOLUTION INTRAVENOUS CONTINUOUS
Status: DISCONTINUED | OUTPATIENT
Start: 2019-11-20 | End: 2019-11-20 | Stop reason: HOSPADM

## 2019-11-20 RX ORDER — CEFAZOLIN SODIUM 1 G/3ML
1 INJECTION, POWDER, FOR SOLUTION INTRAMUSCULAR; INTRAVENOUS SEE ADMIN INSTRUCTIONS
Status: DISCONTINUED | OUTPATIENT
Start: 2019-11-20 | End: 2019-11-20 | Stop reason: HOSPADM

## 2019-11-20 RX ORDER — FENTANYL CITRATE 50 UG/ML
25-50 INJECTION, SOLUTION INTRAMUSCULAR; INTRAVENOUS
Status: DISCONTINUED | OUTPATIENT
Start: 2019-11-20 | End: 2019-11-20 | Stop reason: HOSPADM

## 2019-11-20 RX ORDER — NALOXONE HYDROCHLORIDE 0.4 MG/ML
.1-.4 INJECTION, SOLUTION INTRAMUSCULAR; INTRAVENOUS; SUBCUTANEOUS
Status: DISCONTINUED | OUTPATIENT
Start: 2019-11-20 | End: 2019-11-21 | Stop reason: HOSPADM

## 2019-11-20 RX ORDER — CEFAZOLIN SODIUM 2 G/100ML
2 INJECTION, SOLUTION INTRAVENOUS EVERY 8 HOURS
Status: COMPLETED | OUTPATIENT
Start: 2019-11-20 | End: 2019-11-21

## 2019-11-20 RX ORDER — LIDOCAINE 40 MG/G
CREAM TOPICAL
Status: DISCONTINUED | OUTPATIENT
Start: 2019-11-20 | End: 2019-11-21 | Stop reason: HOSPADM

## 2019-11-20 RX ORDER — FENTANYL CITRATE 50 UG/ML
25-100 INJECTION, SOLUTION INTRAMUSCULAR; INTRAVENOUS
Status: DISCONTINUED | OUTPATIENT
Start: 2019-11-20 | End: 2019-11-20 | Stop reason: HOSPADM

## 2019-11-20 RX ORDER — DEXAMETHASONE SODIUM PHOSPHATE 4 MG/ML
INJECTION, SOLUTION INTRA-ARTICULAR; INTRALESIONAL; INTRAMUSCULAR; INTRAVENOUS; SOFT TISSUE PRN
Status: DISCONTINUED | OUTPATIENT
Start: 2019-11-20 | End: 2019-11-20

## 2019-11-20 RX ORDER — ONDANSETRON 2 MG/ML
4 INJECTION INTRAMUSCULAR; INTRAVENOUS EVERY 30 MIN PRN
Status: DISCONTINUED | OUTPATIENT
Start: 2019-11-20 | End: 2019-11-20 | Stop reason: HOSPADM

## 2019-11-20 RX ORDER — CEFAZOLIN SODIUM IN 0.9 % NACL 3 G/100 ML
3 INTRAVENOUS SOLUTION, PIGGYBACK (ML) INTRAVENOUS
Status: COMPLETED | OUTPATIENT
Start: 2019-11-20 | End: 2019-11-20

## 2019-11-20 RX ORDER — HYDROXYZINE HYDROCHLORIDE 25 MG/1
25 TABLET, FILM COATED ORAL EVERY 6 HOURS PRN
Status: DISCONTINUED | OUTPATIENT
Start: 2019-11-20 | End: 2019-11-21 | Stop reason: HOSPADM

## 2019-11-20 RX ORDER — ONDANSETRON 4 MG/1
4 TABLET, ORALLY DISINTEGRATING ORAL EVERY 30 MIN PRN
Status: DISCONTINUED | OUTPATIENT
Start: 2019-11-20 | End: 2019-11-20 | Stop reason: HOSPADM

## 2019-11-20 RX ORDER — HYDROMORPHONE HYDROCHLORIDE 1 MG/ML
.3-.5 INJECTION, SOLUTION INTRAMUSCULAR; INTRAVENOUS; SUBCUTANEOUS
Status: DISCONTINUED | OUTPATIENT
Start: 2019-11-20 | End: 2019-11-21 | Stop reason: HOSPADM

## 2019-11-20 RX ORDER — ONDANSETRON 2 MG/ML
INJECTION INTRAMUSCULAR; INTRAVENOUS PRN
Status: DISCONTINUED | OUTPATIENT
Start: 2019-11-20 | End: 2019-11-20

## 2019-11-20 RX ORDER — EPHEDRINE SULFATE 50 MG/ML
INJECTION, SOLUTION INTRAMUSCULAR; INTRAVENOUS; SUBCUTANEOUS PRN
Status: DISCONTINUED | OUTPATIENT
Start: 2019-11-20 | End: 2019-11-20

## 2019-11-20 RX ORDER — BISACODYL 10 MG
10 SUPPOSITORY, RECTAL RECTAL DAILY PRN
Status: DISCONTINUED | OUTPATIENT
Start: 2019-11-20 | End: 2019-11-21 | Stop reason: HOSPADM

## 2019-11-20 RX ORDER — HYDROMORPHONE HYDROCHLORIDE 1 MG/ML
.3-.5 INJECTION, SOLUTION INTRAMUSCULAR; INTRAVENOUS; SUBCUTANEOUS EVERY 5 MIN PRN
Status: DISCONTINUED | OUTPATIENT
Start: 2019-11-20 | End: 2019-11-20 | Stop reason: HOSPADM

## 2019-11-20 RX ORDER — POLYETHYLENE GLYCOL 3350 17 G/17G
17 POWDER, FOR SOLUTION ORAL DAILY
Status: DISCONTINUED | OUTPATIENT
Start: 2019-11-20 | End: 2019-11-21 | Stop reason: HOSPADM

## 2019-11-20 RX ORDER — ONDANSETRON 4 MG/1
4 TABLET, ORALLY DISINTEGRATING ORAL EVERY 6 HOURS PRN
Status: DISCONTINUED | OUTPATIENT
Start: 2019-11-20 | End: 2019-11-21 | Stop reason: HOSPADM

## 2019-11-20 RX ORDER — LIDOCAINE HYDROCHLORIDE 20 MG/ML
INJECTION, SOLUTION INFILTRATION; PERINEURAL PRN
Status: DISCONTINUED | OUTPATIENT
Start: 2019-11-20 | End: 2019-11-20

## 2019-11-20 RX ORDER — PROPOFOL 10 MG/ML
INJECTION, EMULSION INTRAVENOUS PRN
Status: DISCONTINUED | OUTPATIENT
Start: 2019-11-20 | End: 2019-11-20

## 2019-11-20 RX ORDER — LEVOTHYROXINE SODIUM 137 UG/1
137 TABLET ORAL
Status: DISCONTINUED | OUTPATIENT
Start: 2019-11-21 | End: 2019-11-21 | Stop reason: HOSPADM

## 2019-11-20 RX ORDER — OXYCODONE HYDROCHLORIDE 5 MG/1
5-10 TABLET ORAL
Status: DISCONTINUED | OUTPATIENT
Start: 2019-11-20 | End: 2019-11-21 | Stop reason: HOSPADM

## 2019-11-20 RX ORDER — ONDANSETRON 2 MG/ML
4 INJECTION INTRAMUSCULAR; INTRAVENOUS EVERY 6 HOURS PRN
Status: DISCONTINUED | OUTPATIENT
Start: 2019-11-20 | End: 2019-11-21 | Stop reason: HOSPADM

## 2019-11-20 RX ORDER — NALOXONE HYDROCHLORIDE 0.4 MG/ML
.1-.4 INJECTION, SOLUTION INTRAMUSCULAR; INTRAVENOUS; SUBCUTANEOUS
Status: DISCONTINUED | OUTPATIENT
Start: 2019-11-20 | End: 2019-11-20

## 2019-11-20 RX ORDER — AMOXICILLIN 250 MG
2 CAPSULE ORAL 2 TIMES DAILY
Status: DISCONTINUED | OUTPATIENT
Start: 2019-11-20 | End: 2019-11-21 | Stop reason: HOSPADM

## 2019-11-20 RX ORDER — SODIUM CHLORIDE, SODIUM LACTATE, POTASSIUM CHLORIDE, CALCIUM CHLORIDE 600; 310; 30; 20 MG/100ML; MG/100ML; MG/100ML; MG/100ML
INJECTION, SOLUTION INTRAVENOUS CONTINUOUS
Status: DISCONTINUED | OUTPATIENT
Start: 2019-11-20 | End: 2019-11-21 | Stop reason: HOSPADM

## 2019-11-20 RX ORDER — KETOROLAC TROMETHAMINE 30 MG/ML
30 INJECTION, SOLUTION INTRAMUSCULAR; INTRAVENOUS
Status: DISCONTINUED | OUTPATIENT
Start: 2019-11-20 | End: 2019-11-20 | Stop reason: HOSPADM

## 2019-11-20 RX ORDER — FENTANYL CITRATE 50 UG/ML
INJECTION, SOLUTION INTRAMUSCULAR; INTRAVENOUS PRN
Status: DISCONTINUED | OUTPATIENT
Start: 2019-11-20 | End: 2019-11-20

## 2019-11-20 RX ADMIN — PHENYLEPHRINE HYDROCHLORIDE 100 MCG: 10 INJECTION INTRAVENOUS at 07:55

## 2019-11-20 RX ADMIN — DEXAMETHASONE SODIUM PHOSPHATE 4 MG: 4 INJECTION, SOLUTION INTRA-ARTICULAR; INTRALESIONAL; INTRAMUSCULAR; INTRAVENOUS; SOFT TISSUE at 07:58

## 2019-11-20 RX ADMIN — FENTANYL CITRATE 50 MCG: 50 INJECTION, SOLUTION INTRAMUSCULAR; INTRAVENOUS at 10:47

## 2019-11-20 RX ADMIN — OXYCODONE HYDROCHLORIDE 5 MG: 5 TABLET ORAL at 23:51

## 2019-11-20 RX ADMIN — SODIUM CHLORIDE, POTASSIUM CHLORIDE, SODIUM LACTATE AND CALCIUM CHLORIDE: 600; 310; 30; 20 INJECTION, SOLUTION INTRAVENOUS at 06:59

## 2019-11-20 RX ADMIN — ASPIRIN 325 MG: 325 TABLET, DELAYED RELEASE ORAL at 18:03

## 2019-11-20 RX ADMIN — HYDROMORPHONE HYDROCHLORIDE 0.5 MG: 1 INJECTION, SOLUTION INTRAMUSCULAR; INTRAVENOUS; SUBCUTANEOUS at 11:02

## 2019-11-20 RX ADMIN — FENTANYL CITRATE 75 MCG: 50 INJECTION, SOLUTION INTRAMUSCULAR; INTRAVENOUS at 07:40

## 2019-11-20 RX ADMIN — SODIUM CHLORIDE, POTASSIUM CHLORIDE, SODIUM LACTATE AND CALCIUM CHLORIDE: 600; 310; 30; 20 INJECTION, SOLUTION INTRAVENOUS at 10:10

## 2019-11-20 RX ADMIN — FENTANYL CITRATE 50 MCG: 50 INJECTION, SOLUTION INTRAMUSCULAR; INTRAVENOUS at 06:56

## 2019-11-20 RX ADMIN — OXYCODONE HYDROCHLORIDE 5 MG: 5 TABLET ORAL at 20:11

## 2019-11-20 RX ADMIN — PHENYLEPHRINE HYDROCHLORIDE 100 MCG: 10 INJECTION INTRAVENOUS at 07:52

## 2019-11-20 RX ADMIN — Medication 5 MG: at 08:05

## 2019-11-20 RX ADMIN — TRANEXAMIC ACID 1 G: 1 INJECTION, SOLUTION INTRAVENOUS at 07:50

## 2019-11-20 RX ADMIN — PROPOFOL 250 MG: 10 INJECTION, EMULSION INTRAVENOUS at 07:36

## 2019-11-20 RX ADMIN — SODIUM CHLORIDE, POTASSIUM CHLORIDE, SODIUM LACTATE AND CALCIUM CHLORIDE: 600; 310; 30; 20 INJECTION, SOLUTION INTRAVENOUS at 22:33

## 2019-11-20 RX ADMIN — PROPOFOL 50 MG: 10 INJECTION, EMULSION INTRAVENOUS at 07:39

## 2019-11-20 RX ADMIN — MIDAZOLAM 2 MG: 1 INJECTION INTRAMUSCULAR; INTRAVENOUS at 07:28

## 2019-11-20 RX ADMIN — FENTANYL CITRATE 25 MCG: 50 INJECTION, SOLUTION INTRAMUSCULAR; INTRAVENOUS at 07:36

## 2019-11-20 RX ADMIN — Medication 3 G: at 07:40

## 2019-11-20 RX ADMIN — PHENYLEPHRINE HYDROCHLORIDE 100 MCG: 10 INJECTION INTRAVENOUS at 08:02

## 2019-11-20 RX ADMIN — Medication 1 G: at 09:40

## 2019-11-20 RX ADMIN — MIDAZOLAM HYDROCHLORIDE 2 MG: 1 INJECTION, SOLUTION INTRAMUSCULAR; INTRAVENOUS at 06:56

## 2019-11-20 RX ADMIN — ONDANSETRON 4 MG: 2 INJECTION INTRAMUSCULAR; INTRAVENOUS at 09:55

## 2019-11-20 RX ADMIN — PROPOFOL 100 MG: 10 INJECTION, EMULSION INTRAVENOUS at 07:41

## 2019-11-20 RX ADMIN — FENTANYL CITRATE 50 MCG: 50 INJECTION, SOLUTION INTRAMUSCULAR; INTRAVENOUS at 10:30

## 2019-11-20 RX ADMIN — SENNOSIDES AND DOCUSATE SODIUM 2 TABLET: 8.6; 5 TABLET ORAL at 20:11

## 2019-11-20 RX ADMIN — HYDROMORPHONE HYDROCHLORIDE 0.5 MG: 1 INJECTION, SOLUTION INTRAMUSCULAR; INTRAVENOUS; SUBCUTANEOUS at 15:39

## 2019-11-20 RX ADMIN — LIDOCAINE HYDROCHLORIDE 60 MG: 20 INJECTION, SOLUTION INFILTRATION; PERINEURAL at 07:36

## 2019-11-20 RX ADMIN — TRANEXAMIC ACID 1 G: 1 INJECTION, SOLUTION INTRAVENOUS at 09:44

## 2019-11-20 RX ADMIN — HYDROMORPHONE HYDROCHLORIDE 0.3 MG: 1 INJECTION, SOLUTION INTRAMUSCULAR; INTRAVENOUS; SUBCUTANEOUS at 13:26

## 2019-11-20 RX ADMIN — PHENYLEPHRINE HYDROCHLORIDE 100 MCG: 10 INJECTION INTRAVENOUS at 07:46

## 2019-11-20 RX ADMIN — Medication 2.5 MG: at 07:59

## 2019-11-20 RX ADMIN — CEFAZOLIN SODIUM 2 G: 2 INJECTION, SOLUTION INTRAVENOUS at 18:03

## 2019-11-20 RX ADMIN — HYDROMORPHONE HYDROCHLORIDE 0.5 MG: 1 INJECTION, SOLUTION INTRAMUSCULAR; INTRAVENOUS; SUBCUTANEOUS at 07:43

## 2019-11-20 RX ADMIN — ROPIVACAINE HYDROCHLORIDE 20 ML GIVEN: 5 INJECTION, SOLUTION EPIDURAL; INFILTRATION; PERINEURAL at 07:00

## 2019-11-20 NOTE — BRIEF OP NOTE
[facility]    Brief Operative Note    Pre-operative diagnosis: Degenerative arthritis of left knee [M17.12]  Post-operative diagnosis Same as pre-operative diagnosis    Procedure: Procedure(s):  LEFT UNICOMPARTMENTAL KNEE ARTHROPLASTY  Surgeon: Surgeon(s) and Role:     * Mary Núñez MD - Primary     * Pablo Colby PA-C - Assisting  Anesthesia: General   Estimated blood loss: Less than 10 ml  Drains: None  Specimens: * No specimens in log *  Findings:   None.  Complications: None.  Implants:   Implant Name Type Inv. Item Serial No.  Lot No. LRB No. Used   BONE CEMENT SIMPLEX W/TOBRAMYCIN 6197-9-001 Cement, Bone BONE CEMENT SIMPLEX W/TOBRAMYCIN 6197-9-001  [employer] [id number] Left 1   OXFORD PARTIAL KNEE SYSTEM LEFT MEDIAL TIBIAL TRAY    BIOMET [id number] Left 1   BIOMET OXFORD PARTIAL KNEE SYSTEM TWIG PEG FEMORAL    BIOMET [id number] Left 1   BIOMET OXFORD PARTIAL KNEE SYSTEM ANATOMIC MENISCAL BEARING    BIOMET [id number] Left 1

## 2019-11-20 NOTE — PROGRESS NOTES
Admission medication history interview status for the 11/20/2019  admission is complete. See EPIC admission navigator for prior to admission medications     Medication history source reliability:Good    Medication history interview source(s):Patient    Medication history resources (including written lists, pill bottles, clinic record):None    Primary pharmacy.[facility]    Additional medication history information not noted on PTA med list :None    Time spent in this activity: 25 minutes    Prior to Admission medications    Medication Sig Last Dose Taking? Auth Provider   Cholecalciferol (VITAMIN D3) 2000 units TABS Take 2,000 Units by mouth daily  11/13/2019 Yes Reported, Patient   levothyroxine (SYNTHROID/LEVOTHROID) 137 MCG tablet Take 137 mcg by mouth daily 11/20/2019 at 0500 Yes Reported, Patient   Menaquinone-7 (VITAMIN K2) 100 MCG CAPS Take 1 tablet by mouth daily  11/13/2019 Yes Reported, Patient   order for DME Equipment being ordered: Walker Wheels () and Walker ()  Treatment Diagnosis: sp TKA   Mary Núñez MD

## 2019-11-20 NOTE — PLAN OF CARE
PT:  Patient plan: Return home tomorrow with OPPT  Current status: Orders received, eval completed, treatment initiated. Pt is POD 0 L unicompartmental knee, seen under outpatient status. Prior to admit pt was living with her spouse in a 3 story home, no AD use, independent with mobility. Currently requires SBA for bed mobility, CGA sit to/from stand with FWW, CGA for gait of 350 ft with FWW with mild knee buckling at the end of gait but otherwise very stable with low pain. Participated well in LE strengthening and ROM activities. Pt demonstrates pain, dec ROM, strength, balance, activity tolerance and difficulty ambulating and transferring and would benefit from skilled PT services in order to improve this.  Anticipated status at discharge: Pt will need to be able to transfer and ambulate with FWW and assist of 1 or less, ascend and descend stairs with assist of 1 or less with rail.

## 2019-11-20 NOTE — ANESTHESIA CARE TRANSFER NOTE
Patient: Rachel Stone    Procedure(s):  LEFT UNICOMPARTMENTAL KNEE ARTHROPLASTY    Diagnosis: Degenerative arthritis of left knee [M17.12]  Diagnosis Additional Information: No value filed.    Anesthesia Type:   General, LMA, Peripheral Nerve Block, For Post-op pain in coordination with surgeon     Note:  Airway :Face Mask  Patient transferred to:PACU  Comments: At end of procedure, spontaneous respirations, adequate tidal volumes, followed commands to voice, LMA removed atraumatically, airway patent after LMA removal. Oxygen via facemask at 10 liters per minute to PACU. Oxygen tubing connected to wall O2 in PACU, SpO2, NiBP, and EKG monitors and alarms on and functioning, Gloria Hugger warmer connected to patient gown, report on patient's clinical status given to PACU RN, RN questions answered.Handoff Report: Identifed the Patient, Identified the Reponsible Provider, Reviewed the pertinent medical history, Discussed the surgical course, Reviewed Intra-OP anesthesia mangement and issues during anesthesia, Set expectations for post-procedure period and Allowed opportunity for questions and acknowledgement of understanding      Vitals: (Last set prior to Anesthesia Care Transfer)    CRNA VITALS  11/20/2019 0945 - 11/20/2019 1024      11/20/2019             Pulse:  92    SpO2:  99 %    Resp Rate (observed):  8    EKG:  Sinus rhythm                Electronically Signed By: GUILLERMINA Padilla CRNA  November 20, 2019  10:24 AM

## 2019-11-20 NOTE — OP NOTE
Procedure Date: 11/20/2019      PREOPERATIVE DIAGNOSIS:  Medial compartment arthritis, left knee.      POSTOPERATIVE DIAGNOSIS:  Medial compartment arthritis, left knee.      PROCEDURE:  Left unicompartmental knee replacement using the Oxford prosthesis, a size medium femur, 5 mm bearing, and a size B tibial tray.      INDICATIONS FOR PROCEDURE:  The patient is a 54-year-old female who has had a previous right total knee arthroplasty.  She has had medial compartment arthritis of the left knee and has exhausted conservative treatment.  I discussed treatment options with the patient.  I explained to her the risks, benefits, potential complications of unicompartmental knee replacement versus total knee replacement.  This discussion included but was not specific to infection, vascular and neurologic complications, DVT, septic and aseptic loosening, arthrofibrosis and the possible need for further revision surgery.  I also discussed with the patient that she may go on to develop degenerative changes in the remaining portion of her knee, even after successful unicompartmental knee replacement.  She voiced understanding and wanted to proceed.      ANESTHESIA:  General.      SURGEON:  Mary Núñez MD.        ASSISTANT:  Mikey Colby PA-C.      DESCRIPTION OF PROCEDURE:  The patient was taken to the operating room and placed on the table in supine position.  After adequate induction of general anesthetic, a bump was placed beneath the left hip.  Pneumatic tourniquet was applied to left thigh.  The patient was given 3 grams of Ancef for infection prophylaxis given 1 hour prior to incision.  We therefore performed a sterile prep and drape of the left knee and left lower extremity.  After sterile prep and drape, the limb was exsanguinated, tourniquet was elevated to 300 mmHg.  I then made a longitudinal incision extending from the medial border of the superior pole of the patella to the medial border of the tibial tubercle.  I  then made a medial parapatellar arthrotomy.  We were then able to visualize the joint.  She had grade 4 bone-on-bone changes of the medial compartment.  Patellofemoral joint was in good condition.  Lateral compartment was in pristine condition, normal-appearing meniscus and normal-appearing articular surfaces.  At this point, we decided to proceed with unicompartmental knee replacement.        We used the extramedullary guide to establish my tibial cut.  Once the tibial cut was made, we used the extramedullary guide to align the femoral cut with the tibial cut.  We then through a series of milled reamers equalized the flexion-extension gap.  We then applied the trial components.  We had good soft tissue balancing in both flexion and extension.  We then finished preparation of the tibia by making the keel cut and finished preparation of the femur.  We then removed the trial components, irrigated with pulse jet lavage.  While the cement was being mixed, we injected the posterior capsule with a mixture of ropivacaine and Toradol.  Once the cement was of appropriate consistency, we cemented the tibial component followed by the femoral component.  Once the tibial component was fully seated, excess cement was removed using [place] elevator.  Care was taken to remove any excess cement.  We then put in the femoral trial.  Once it was fully seated, excess cement was removed.  We then put in a 5 mm spacer in and placed the knee in approximately 45 degrees of flexion and allowed the cement to harden.  Any excess cement was removed at this time.  Once the cement had hardened, we took the knee through a range of motion with the trial bearing in place and it tracked ideally.  We had good soft tissue balancing in both flexion and extension.  We then removed the trial bearing, again inspected for any loose bone or cement, removed it when it was found, irrigated with pulse jet lavage and put in the actual bearing.  Once the bearing  was in place, we again took the knee through a range of motion.  The bearing tracked ideally.  We had good soft tissue balance in flexion and extension.  The patella tracked ideally.        Prior to closure, we did soak the knee in a dilute solution of Betadine for a minute.  After that, we irrigated using pulse jet lavage.  We then placed the knee in approximately 30 degrees of flexion and closed the arthrotomy using 0 Ethibond sutures.  This was oversewn using 0 Stratafix.  Deep subcutaneous was closed using 0 Vicryl, superficial subcutaneous was closed with 2-0 Vicryl, skin was closed with a running 3-0 Monocryl subcuticular stitch followed by Prineo dressing.  Sterile dressing was applied followed by knee immobilizer.  The patient tolerated the operative procedure.  There were no intraoperative complications.  The patient went to the PAR in stable condition.      PLAN:  The plan will be for the patient to get 24 hours of Ancef for infection prophylaxis, 30 days of aspirin for DVT prophylaxis.         FINA GORE MD             D: 2019   T: 2019   MT: ELLA      Name:     LEE NAIDU   MRN:      [id number]        Account:        [id number]   :      1965           Procedure Date: 2019      Document: [id number]

## 2019-11-20 NOTE — ANESTHESIA PREPROCEDURE EVALUATION
Anesthesia Pre-Procedure Evaluation    Patient: Rachel Stone   MRN: [id number] : 1965          Preoperative Diagnosis: Degenerative arthritis of left knee [M17.12]    Procedure(s):  LEFT UNICOMPARTMENTAL KNEE ARTHROPLASTY VERSUS A TOTAL LEFT KNEE ARTHROPLASTY (OXFORD VERSUS DEPUY)^^    Past Medical History:   Diagnosis Date     Arthritis     OA knee     Thyroid disease     hypothyroidism     Past Surgical History:   Procedure Laterality Date     ARTHROPLASTY KNEE Right 2019    Procedure: Complex right total knee arthroplasty using the DePuy Attune knee system, posterior stabilized rotating platform, #6 narrow femur, #6 tibia, 38 mm patellar button and 10 mm of tibial bone graft;  Surgeon: Mary Núñez MD;  Location: [facility] OR     ENT SURGERY      tonsillectomy as a child     GYN SURGERY      C/S     HEAD & NECK SURGERY      wisdom teeth removed age 21     ORTHOPEDIC SURGERY Right 2014    meniscus repair knee arthroscopy       Anesthesia Evaluation     . Pt has had prior anesthetic.     No history of anesthetic complications          ROS/MED HX    ENT/Pulmonary:      (-) sleep apnea   Neurologic:       Cardiovascular:         METS/Exercise Tolerance:     Hematologic:         Musculoskeletal:   (+) arthritis,  other musculoskeletal- prior TKA right side      GI/Hepatic:        (-) GERD   Renal/Genitourinary:         Endo:     (+) thyroid problem hypothyroidism, Obesity (BMI 42), .      Psychiatric:         Infectious Disease:         Malignancy:         Other:                          Physical Exam  Normal systems: cardiovascular, pulmonary and dental    Airway   Mallampati: II  TM distance: >3 FB  Neck ROM: full    Dental     Cardiovascular       Pulmonary             Lab Results   Component Value Date    HGB 10.4 (L) 2019     (H) 2019    HCG Negative 2019       Preop Vitals  BP Readings from Last 3 Encounters:   19 131/57   19 121/52    Pulse Readings from Last 3  "Encounters:   11/20/19 79   06/18/19 59      Resp Readings from Last 3 Encounters:   11/20/19 10   06/19/19 14    SpO2 Readings from Last 3 Encounters:   11/20/19 95%   06/19/19 100%      Temp Readings from Last 1 Encounters:   11/20/19 36.4  C (97.6  F) (Axillary)    Ht Readings from Last 1 Encounters:   11/20/19 1.702 m (5' 7\")      Wt Readings from Last 1 Encounters:   11/20/19 122 kg (269 lb)    Estimated body mass index is 42.13 kg/m  as calculated from the following:    Height as of this encounter: 1.702 m (5' 7\").    Weight as of this encounter: 122 kg (269 lb).       Anesthesia Plan      History & Physical Review  History and physical reviewed and following examination; no interval change.    ASA Status:  3 .    NPO Status:  > 8 hours    Plan for General, LMA, Peripheral Nerve Block and For Post-op pain in coordination with surgeon with Intravenous induction. Maintenance will be Balanced.    PONV prophylaxis:  Ondansetron (or other 5HT-3) and Dexamethasone or Solumedrol  Pt had a GA+PNB 6/2019 with her other knee, and requests the same type of anesthesia    Adductor canal block for postop pain      Postoperative Care  Postoperative pain management:  IV analgesics, Multi-modal analgesia and Peripheral nerve block (Single Shot).      Consents  Anesthetic plan, risks, benefits and alternatives discussed with:  Patient..                 William Kumar MD  "

## 2019-11-20 NOTE — PLAN OF CARE
Patient POD 0 left knee arthroplasty. A&Ox4. Gets drowsy with narcotics. Severe allergy to ibuprofen and tylenol. Arrived on unit at 1200, VSS ex RR low while sleeping. Dressing CDI with ice packs. CMS intact ex tingling in LLE. Pain at 4-6/10, controlled with IV dilaudid with pain down to 1/10. Aguilar removed on unit, patient due to void by 1600. LR running at 75 mL/hour. Continue to monitor.

## 2019-11-20 NOTE — ANESTHESIA POSTPROCEDURE EVALUATION
Patient: Rachel Stone    Procedure(s):  LEFT UNICOMPARTMENTAL KNEE ARTHROPLASTY    Diagnosis:Degenerative arthritis of left knee [M17.12]  Diagnosis Additional Information: No value filed.    Anesthesia Type:  General, LMA, Peripheral Nerve Block, For Post-op pain in coordination with surgeon    Note:  Anesthesia Post Evaluation    Patient location during evaluation: PACU  Patient participation: Able to fully participate in evaluation  Level of consciousness: sleepy but conscious and responsive to verbal stimuli  Pain management: adequate  Airway patency: patent  Cardiovascular status: acceptable and hemodynamically stable  Respiratory status: acceptable and unassisted  Hydration status: acceptable  PONV: none     Anesthetic complications: None          Last vitals:  Vitals:    11/20/19 1120 11/20/19 1125 11/20/19 1139   BP: 101/46  (!) 148/73   Pulse: 73     Resp: 12  14   Temp:   36.8  C (98.3  F)   SpO2: 100% 100% 100%         Electronically Signed By: Gerard Mejia MD  November 20, 2019  11:52 AM

## 2019-11-20 NOTE — ANESTHESIA PROCEDURE NOTES
Peripheral nerve/Neuraxial procedure note : Femoral and Adductor canal  Pre-Procedure  Performed by William Kumar MD  Location: pre-op      Pre-Anesthestic Checklist: patient identified, IV checked, risks and benefits discussed, informed consent, pre-op evaluation, at physician/surgeon's request and post-op pain management    Timeout  Correct Patient: Yes   Correct Procedure: Yes   Correct Site: Yes   Correct Laterality: Yes   Correct Position: Yes   Site Marked: Yes   .   Procedure Documentation    .    Procedure: Femoral and Adductor canal, left.   Patient Position:supine Local skin infiltrated with mL of 1% lidocaine.    Ultrasound used to identify targeted nerve, plexus, or vascular marker and placed a needle adjacent to it., Ultrasound was used to visualize the spread of the anesthetic in close proximity to the above stated nerve. A permanent image is entered into the patient's record.  Patient Prep/Sterile Barriers; chlorhexidine gluconate and isopropyl alcohol.  .  Needle: short bevel   Needle Gauge: 21.    Needle Length (Inches) 3.13   Insertion Method: Single Shot.        Assessment/Narrative  Paresthesias: No.  Injection made incrementally with aspirations every 5 mL..  The placement was negative for: blood aspirated, painful injection and site bleeding.  Bolus given via needle..   Secured via.   Complications: none. Comments:  Ultrasound Interpretation, peripheral nerve block    1.  Under ultrasound guidance, needle was inserted and placed in close proximity to the nerves  2. Ultrasound was also used to visualize the spread of the anesthetic in close proximity to the nerves being blocked.  3. The nerves appeared anatomically normal.  4. There were no apparent abnormal pathological findings.  5. A permanent ultrasound image was saved n the patient's record.    William Kumar MD   7:02 AM

## 2019-11-20 NOTE — PROGRESS NOTES
"Rachel Stone  2019  POD # 1 s/p REGAN  Admit Date:  2019      Doing well.  Objective: states pain well controlled. Working well with therapy. Hopes to leave today but has some nausea.   Blood pressure 108/67, pulse 90, temperature 96.2  F (35.7  C), temperature source Temporal, resp. rate 8, height 1.702 m (5' 7\"), weight 122 kg (269 lb), last menstrual period 11/15/2019, SpO2 100 %.    Temperatures:  Current - Temp: 96.2  F (35.7  C); Max - Temp  Av.9  F (36.1  C)  Min: 96.2  F (35.7  C)  Max: 97.6  F (36.4  C)  Pulse range: Pulse  Av.5  Min: 79  Max: 90  Blood pressure range: Systolic (24hrs), Av , Min:108 , Max:131   ; Diastolic (24hrs), Av, Min:57, Max:67    Exam:  CMS: intact  alert, stable, wound ok  Dressing c/d/i  Calf s/nt  DF/PF    Communicates clearly with examiner  Able to perform SLR without assistance    Labs:  No results for input(s): POTASSIUM in the last 01387 hours.  Recent Labs   Lab Test 19  0654   HGB 10.4*     No results for input(s): INR in the last 55743 hours.  No results for input(s): PLT in the last 30374 hours.    PLAN: doing well overall. Plans on going home today if nausea resolves.     "

## 2019-11-20 NOTE — PROGRESS NOTES
11/20/19 1607   Quick Adds   Type of Visit Initial PT Evaluation   Living Environment   Lives With spouse   Living Arrangements house   Home Accessibility stairs to enter home;stairs within home   Number of Stairs, Main Entrance 2   Stair Railings, Main Entrance none   Number of Stairs, Within Home, Primary 10   Stair Railings, Within Home, Primary railing on right side (ascending)   Self-Care   Usual Activity Tolerance good   Current Activity Tolerance moderate   Equipment Currently Used at Home none  (owns FWW, crutches and cane)   Activity/Exercise/Self-Care Comment Pt had R TKA 5 months ago   Functional Level Prior   Ambulation 0-->independent   Transferring 0-->independent   Fall history within last six months no   General Information   Onset of Illness/Injury or Date of Surgery - Date 11/20/19   Referring Physician Pablo Colby PA-C   Patient/Family Goals Statement Return home tomorrow with OPPT   Pertinent History of Current Problem (include personal factors and/or comorbidities that impact the POC) Pt is POD 0 L unicompartmental arthroplasty. PMH: morbid obesity, hypothyroid.   Precautions/Limitations fall precautions   Weight-Bearing Status - LLE weight-bearing as tolerated   Weight-Bearing Status - RLE full weight-bearing   General Observations Spouse present   Cognitive Status Examination   Orientation orientation to person, place and time   Level of Consciousness alert   Follows Commands and Answers Questions 100% of the time;able to follow multistep instructions   Personal Safety and Judgment intact   Memory intact   Pain Assessment   Patient Currently in Pain Yes, see Vital Sign flowsheet  (1/10 at rest, minimal with gait)   Posture    Posture Not impaired   Range of Motion (ROM)   ROM Comment R LE WNL, L LE limited by pain and surgery   Strength   Strength Comments R LE WNL, L LE functionally weak   Bed Mobility   Bed Mobility Comments SBA supine to sit   Transfer Skills   Transfer Comments CGA  "sit to stand with FWW   Gait   Gait Comments Pt amb 10 ft with FWW and CGA, steady gait   Balance   Balance Comments Requires FWW for steady balance   Sensory Examination   Sensory Perception Comments Denies numbness or tingling   General Therapy Interventions   Planned Therapy Interventions bed mobility training;gait training;ROM;strengthening;transfer training   Clinical Impression   Criteria for Skilled Therapeutic Intervention yes, treatment indicated   PT Diagnosis Difficulty ambulating   Influenced by the following impairments Pain, dec ROM, strength, balance, activity toleranec   Functional limitations due to impairments Difficulty ambulating and transferring   Clinical Presentation Stable/Uncomplicated   Clinical Presentation Rationale medically stable   Clinical Decision Making (Complexity) Low complexity   Therapy Frequency 2x/day   Predicted Duration of Therapy Intervention (days/wks) 2 days   Anticipated Discharge Disposition Other (see comments)  (defer to ortho surgeon/PA)   Risk & Benefits of therapy have been explained Yes   Patient, Family & other staff in agreement with plan of care Yes   [facility] TM \"6 Clicks\"   2016, Trustees of [facility], under license to [employer].  All rights reserved.   6 Clicks Short Forms Basic Mobility Inpatient Short Form   [facility]  \"6 Clicks\" V.2 Basic Mobility Inpatient Short Form   1. Turning from your back to your side while in a flat bed without using bedrails? 4 - None   2. Moving from lying on your back to sitting on the side of a flat bed without using bedrails? 4 - None   3. Moving to and from a bed to a chair (including a wheelchair)? 3 - A Little   4. Standing up from a chair using your arms (e.g., wheelchair, or bedside chair)? 3 - A Little   5. To walk in hospital room? 3 - A Little   6. Climbing 3-5 steps with a railing? 3 - A Little   Basic Mobility Raw Score (Score out of 24.Lower scores equate to lower levels of " function) 20   Total Evaluation Time   Total Evaluation Time (Minutes) 15

## 2019-11-20 NOTE — OR NURSING
Paged BANDAR Lock to clarify if dawson to stay in until POD# 1.  No answer yet.  Notified 55 RN of this during report.

## 2019-11-21 ENCOUNTER — APPOINTMENT (OUTPATIENT)
Dept: PHYSICAL THERAPY | Facility: CLINIC | Age: 54
End: 2019-11-21
Attending: ORTHOPAEDIC SURGERY
Payer: COMMERCIAL

## 2019-11-21 VITALS
BODY MASS INDEX: 42.22 KG/M2 | OXYGEN SATURATION: 97 % | HEART RATE: 80 BPM | TEMPERATURE: 98.2 F | WEIGHT: 269 LBS | DIASTOLIC BLOOD PRESSURE: 59 MMHG | RESPIRATION RATE: 16 BRPM | SYSTOLIC BLOOD PRESSURE: 144 MMHG | HEIGHT: 67 IN

## 2019-11-21 LAB
GLUCOSE SERPL-MCNC: 135 MG/DL (ref 70–99)
HGB BLD-MCNC: 10.5 G/DL (ref 11.7–15.7)

## 2019-11-21 PROCEDURE — 36415 COLL VENOUS BLD VENIPUNCTURE: CPT | Performed by: PHYSICIAN ASSISTANT

## 2019-11-21 PROCEDURE — 85018 HEMOGLOBIN: CPT | Performed by: PHYSICIAN ASSISTANT

## 2019-11-21 PROCEDURE — 82947 ASSAY GLUCOSE BLOOD QUANT: CPT | Performed by: PHYSICIAN ASSISTANT

## 2019-11-21 PROCEDURE — 40000894 ZZH STATISTIC OT IP EVAL DEFER

## 2019-11-21 PROCEDURE — 97116 GAIT TRAINING THERAPY: CPT | Mod: GP

## 2019-11-21 PROCEDURE — 25000128 H RX IP 250 OP 636: Performed by: PHYSICIAN ASSISTANT

## 2019-11-21 PROCEDURE — 25000132 ZZH RX MED GY IP 250 OP 250 PS 637: Performed by: PHYSICIAN ASSISTANT

## 2019-11-21 RX ORDER — CELECOXIB 200 MG/1
200 CAPSULE ORAL DAILY
Qty: 30 CAPSULE | Refills: 0 | Status: SHIPPED | OUTPATIENT
Start: 2019-11-21

## 2019-11-21 RX ORDER — ONDANSETRON 4 MG/1
4 TABLET, ORALLY DISINTEGRATING ORAL EVERY 6 HOURS PRN
Qty: 30 TABLET | Refills: 0 | Status: SHIPPED | OUTPATIENT
Start: 2019-11-21

## 2019-11-21 RX ORDER — OXYCODONE HYDROCHLORIDE 5 MG/1
5-10 TABLET ORAL
Qty: 30 TABLET | Refills: 0 | Status: SHIPPED | OUTPATIENT
Start: 2019-11-21

## 2019-11-21 RX ORDER — HYDROXYZINE HYDROCHLORIDE 25 MG/1
25 TABLET, FILM COATED ORAL EVERY 6 HOURS PRN
Qty: 30 TABLET | Refills: 0 | Status: SHIPPED | OUTPATIENT
Start: 2019-11-21

## 2019-11-21 RX ORDER — KETOROLAC TROMETHAMINE 10 MG/1
10 TABLET, FILM COATED ORAL EVERY 6 HOURS PRN
Qty: 4 TABLET | Refills: 0 | Status: SHIPPED | OUTPATIENT
Start: 2019-11-21

## 2019-11-21 RX ORDER — AMOXICILLIN 250 MG
2 CAPSULE ORAL 2 TIMES DAILY
Qty: 30 TABLET | Refills: 0 | Status: SHIPPED | OUTPATIENT
Start: 2019-11-21

## 2019-11-21 RX ORDER — ASPIRIN 325 MG
325 TABLET, DELAYED RELEASE (ENTERIC COATED) ORAL DAILY
Qty: 30 TABLET | Refills: 0 | Status: SHIPPED | OUTPATIENT
Start: 2019-11-22

## 2019-11-21 RX ADMIN — OXYCODONE HYDROCHLORIDE 10 MG: 5 TABLET ORAL at 06:58

## 2019-11-21 RX ADMIN — SENNOSIDES AND DOCUSATE SODIUM 2 TABLET: 8.6; 5 TABLET ORAL at 09:27

## 2019-11-21 RX ADMIN — OXYCODONE HYDROCHLORIDE 5 MG: 5 TABLET ORAL at 02:55

## 2019-11-21 RX ADMIN — OXYCODONE HYDROCHLORIDE 10 MG: 5 TABLET ORAL at 13:19

## 2019-11-21 RX ADMIN — LEVOTHYROXINE SODIUM 137 MCG: 137 TABLET ORAL at 06:58

## 2019-11-21 RX ADMIN — OXYCODONE HYDROCHLORIDE 10 MG: 5 TABLET ORAL at 10:17

## 2019-11-21 RX ADMIN — POLYETHYLENE GLYCOL 3350 17 G: 17 POWDER, FOR SOLUTION ORAL at 09:27

## 2019-11-21 RX ADMIN — ASPIRIN 325 MG: 325 TABLET, DELAYED RELEASE ORAL at 09:27

## 2019-11-21 RX ADMIN — CEFAZOLIN SODIUM 2 G: 2 INJECTION, SOLUTION INTRAVENOUS at 02:50

## 2019-11-21 NOTE — DISCHARGE SUMMARY
Orthopedic Discharge Summary   Patient: Rachel Stone  Admission Date: 11/20/2019  Discharge Date: 11/21/19  Date of Service: 11/21/2019  Attending Provider: Mary Núñez MD  Admission Diagnosis: Degenerative arthritis of left knee [M17.12]  Discharge Diagnosis: left knee osteoarthritis  Procedures Performed: left unicompartmental knee replacement  Complications: None apparent   History of Present Illness: see operative report for full HPI  Past Medical History:   Past Medical History:   Diagnosis Date     Arthritis     OA knee     Thyroid disease     hypothyroidism     Patient Active Problem List   Diagnosis     Total knee replacement status, right     S/P left unicompartmental knee replacement     Past Surgical History:   Procedure Laterality Date     ARTHROPLASTY KNEE Right 6/18/2019    Procedure: Complex right total knee arthroplasty using the [place] [employer] knee system, posterior stabilized rotating platform, #6 narrow femur, #6 tibia, 38 mm patellar button and 10 mm of tibial bone graft;  Surgeon: Mary Núñez MD;  Location: [facility] OR     ENT SURGERY      tonsillectomy as a child     GYN SURGERY  2001    C/S     HEAD & NECK SURGERY      wisdom teeth removed age 21     ORTHOPEDIC SURGERY Right 2014    meniscus repair knee arthroscopy     Social History     Socioeconomic History     Marital status:      Spouse name: Not on file     Number of children: Not on file     Years of education: Not on file     Highest education level: Not on file   Occupational History     Not on file   Social Needs     Financial resource strain: Not on file     Food insecurity:     Worry: Not on file     Inability: Not on file     Transportation needs:     Medical: Not on file     Non-medical: Not on file   Tobacco Use     Smoking status: Never Smoker     Smokeless tobacco: Never Used   Substance and Sexual Activity     Alcohol use: Yes     Comment: 1-2 per month      Drug use: Never     Sexual activity: Not on file    Lifestyle     Physical activity:     Days per week: Not on file     Minutes per session: Not on file     Stress: Not on file   Relationships     Social connections:     Talks on phone: Not on file     Gets together: Not on file     Attends Rastafari service: Not on file     Active member of club or organization: Not on file     Attends meetings of clubs or organizations: Not on file     Relationship status: Not on file     Intimate partner violence:     Fear of current or ex partner: Not on file     Emotionally abused: Not on file     Physically abused: Not on file     Forced sexual activity: Not on file   Other Topics Concern     Not on file   Social History Narrative     Not on file     Medications on admission:   Medications Prior to Admission   Medication Sig Dispense Refill Last Dose     Cholecalciferol (VITAMIN D3) 2000 units TABS Take 2,000 Units by mouth daily    11/13/2019     levothyroxine (SYNTHROID/LEVOTHROID) 137 MCG tablet Take 137 mcg by mouth daily   11/20/2019 at 0500     Menaquinone-7 (VITAMIN K2) 100 MCG CAPS Take 1 tablet by mouth daily    11/13/2019     order for DME Equipment being ordered: Walker Wheels () and Walker ()  Treatment Diagnosis: sp TKA 1 each 0      Allergies:    Allergies   Allergen Reactions     Apple Swelling     Hives and possible swelling of throat     [place] Oil [Fish Oil] Swelling     Hives and swelling of throat     Acetaminophen Hives     Itching and hives     Ibuprofen Hives     Itching and hives       Hospital Course: Patient was admitted to Orthopedics and brought to the OR on where she underwent the above named procedure. Postoperatively she did very well with no apparent complications, and she had an uneventful hospital stay. Ancef was given for 24 hours after surgery. She was given Aspirin for DVT prophylaxis and her pain was well controlled with oral meds, then transitioned to oral pain medications during her hospital stay. She progressed well with  physical therapy and discharge to home was recommended. Her chronic medical problems were followed by the medicine team during her hospital stay and there were no significant changes to conditions or treatment plans. No new medical problems presented during her hospital stay.   Consultations Obtained During Hospitalization:  1. Internal medicine for management of comorbid conditions and home medication management.  2. Physical Therapy for gait training, mobilization, range of motion and strengthening exercises  3. Occupational Therapy for activities of daily living.  4. Social Work for disposition planning.  Active Problems:    S/P left unicompartmental knee replacement    Discharge Disposition: patient improving  Discharge Diet: resume normal pre op diet  Discharge Medications:   Current Discharge Medication List      START taking these medications    Details   aspirin (ASA) 325 MG EC tablet Take 1 tablet (325 mg) by mouth daily  Qty: 30 tablet, Refills: 0    Associated Diagnoses: S/P left unicompartmental knee replacement      celecoxib (CELEBREX) 200 MG capsule Take 1 capsule (200 mg) by mouth daily  Qty: 30 capsule, Refills: 0    Comments: Begin after toradol regimen complete.  Associated Diagnoses: S/P left unicompartmental knee replacement      hydrOXYzine (ATARAX) 25 MG tablet Take 1 tablet (25 mg) by mouth every 6 hours as needed for other (adjuvant pain)  Qty: 30 tablet, Refills: 0    Associated Diagnoses: S/P left unicompartmental knee replacement      ketorolac (TORADOL) 10 MG tablet Take 1 tablet (10 mg) by mouth every 6 hours as needed for moderate pain  Qty: 4 tablet, Refills: 0    Comments: Patient is to take this before starting celebrex  Associated Diagnoses: S/P left unicompartmental knee replacement      ondansetron (ZOFRAN-ODT) 4 MG ODT tab Take 1 tablet (4 mg) by mouth every 6 hours as needed for nausea or vomiting  Qty: 30 tablet, Refills: 0    Associated Diagnoses: S/P left unicompartmental  knee replacement      oxyCODONE (ROXICODONE) 5 MG tablet Take 1-2 tablets (5-10 mg) by mouth every 3 hours as needed for breakthrough pain  Qty: 30 tablet, Refills: 0    Associated Diagnoses: S/P left unicompartmental knee replacement      senna-docusate (SENOKOT-S/PERICOLACE) 8.6-50 MG tablet Take 2 tablets by mouth 2 times daily  Qty: 30 tablet, Refills: 0    Associated Diagnoses: S/P left unicompartmental knee replacement         CONTINUE these medications which have NOT CHANGED    Details   Cholecalciferol (VITAMIN D3) 2000 units TABS Take 2,000 Units by mouth daily       levothyroxine (SYNTHROID/LEVOTHROID) 137 MCG tablet Take 137 mcg by mouth daily      Menaquinone-7 (VITAMIN K2) 100 MCG CAPS Take 1 tablet by mouth daily       order for DME Equipment being ordered: Walker Wheels () and Walker ()  Treatment Diagnosis: sp TKA  Qty: 1 each, Refills: 0    Associated Diagnoses: Total knee replacement status, right           Code Status:   X-rays needed at the follow up visit:   Pablo Colby PA-C  11/21/2019 10:34 AM   [facility]  [place]  [phone]

## 2019-11-21 NOTE — PROGRESS NOTES
Patient vital signs are at baseline: Yes  Patient able to ambulate as they were prior to admission or with assist devices provided by therapies during their stay:  Yes  Patient MUST void prior to discharge:  Yes  Patient able to tolerate oral intake:  Yes  Pain has adequate pain control using Oral analgesics:  Yes    Pt waiting for MD to round

## 2019-11-21 NOTE — PROGRESS NOTES
Patient vital signs are at baseline: yes  Patient able to ambulate as they were prior to admission or with assist devices provided by therapies during their stay:  Yes  Patient MUST void prior to discharge:  Yes  Patient able to tolerate oral intake:  Yes  Pain has adequate pain control using Oral analgesics:  Yes    A&O. VSS on RA. Up w/1 and voiding in BR. CMS intact. Dressing CDI. Taking oxycodone for pain. Plans to discharge home today.

## 2019-11-21 NOTE — PROGRESS NOTES
"Rachel Stone  2019  POD # 1 s/p REGAN  Admit Date:  2019      Doing well.  Objective: no chest pain or shortness of breath. Working well with therapy. Plans on going home this afternoon.   Blood pressure 128/64, pulse 74, temperature 97.9  F (36.6  C), temperature source Oral, resp. rate 16, height 1.702 m (5' 7\"), weight 122 kg (269 lb), last menstrual period 11/15/2019, SpO2 100 %.    Temperatures:  Current - Temp: 97.9  F (36.6  C); Max - Temp  Av.3  F (36.8  C)  Min: 97.7  F (36.5  C)  Max: 98.6  F (37  C)  Pulse range: Pulse  Av  Min: 72  Max: 80  Blood pressure range: Systolic (24hrs), Av , Min:101 , Max:171   ; Diastolic (24hrs), Av, Min:46, Max:73    Exam:  CMS: intact  alert, stable, wound ok  Dressing c/d/i  Calf s/nt  DF/PF   Communicates clearly  Able to perform SLR without assistance    Labs:  No results for input(s): POTASSIUM in the last 14106 hours.  Recent Labs   Lab Test 19  0658 19  0654   HGB 10.5* 10.4*     No results for input(s): INR in the last 26974 hours.  No results for input(s): PLT in the last 88107 hours.    PLAN: aspirin for dvt ppx. Discharge for home today.     "

## 2019-11-21 NOTE — PROGRESS NOTES
Patient vital signs are at baseline: Yes  Patient able to ambulate as they were prior to admission or with assist devices provided by therapies during their stay:  Yes  Patient MUST void prior to discharge:  Yes  Patient able to tolerate oral intake:  Yes  Pain has adequate pain control using Oral analgesics:  Yes    Waiting for meds

## 2019-11-21 NOTE — PROVIDER NOTIFICATION
MARTHA Notification    Notified Person: MARTHA    Notified Person Name: Mikey MARTHA Colby    Notification Date/Time: 11/21/2019 at 1223    Notification Interaction: Phone call    Purpose of Notification: Patient discharging on Celebrex and Ketorolac. Discharge pharmacy inquiring if okay to dispense given patient's history of allergies to ibuprofen. Per patient, she has never taken ketorolac or Celebrex previously.    Orders Received: Per MARTHA Jensen, okay to not dispense the Celebrex and Ketorolac if patient does not want these filled.    Comments: Discharge pharmacy notified of the above.

## 2019-11-21 NOTE — PLAN OF CARE
AO X4. VSS ex slightly hypertensive. SBA w/ walker. Weight bearing as tolerated. Tolerated regular diet. C/O pain 6/10, decreased with PRN oral oxy and ice application. Voiding well. Antiembolic stocking on BLE. Ready to discharge, waiting for pharmacy meds. Dressing changed. Discharge instructions discussed w/ patient. Discharge meds discussed and given to patient. Personal belongings w/ patient. Discharge to previous living, home with .

## 2019-11-21 NOTE — PLAN OF CARE
Patient alert and oriented, VSS, regular diet, walked on the hallway with PT, iv dilaudid 0.3 for pain management, pt denies pain now

## 2019-11-21 NOTE — PLAN OF CARE
Patient plan: return home today with OPPT  Current status: Pt reports 5/10 left knee pain. IND bed mobility, mod IND with sit to stand transfers with FWW x 3 reps, multiple surfaces. Amb initially with SBA, then able to progress to mod IND 150ft x 2 with FWW, heel toe pattern bilaterally and partial step through pattern. Up/down 3 steps x 2 reps with right railing and left axillary crutch, initially SBA with cues for sequencing, then able to progress to mod IND. Left knee AAROM: -15 to 70deg. Pt reports no questions with HEP as she is familiar with them.   Anticipated status at discharge: Pt currently demonstrating mod IND with all mobility. Pt has all equipment for discharge.       Physical Therapy Discharge Summary    Reason for therapy discharge:    All goals and outcomes met, no further needs identified.    Progress towards therapy goal(s). See [facility] on [facility] record for goal details.  Goals met    Therapy recommendation(s):    Continued therapy is recommended.  Rationale/Recommendations:  pt will benefit from continued outpt PT to maximize her return to IND mobility without the use of an assistive device. .  Continue home exercise program.

## 2019-11-21 NOTE — PLAN OF CARE
Patient plan: Home today    Current status: Order received, chart reviewed. Pt working with PT this morning and discussed OT needs with both pt and PT. Pt had R TKA ~5 months ago and does not feel skilled OT is necessary during hospitalization.     Anticipated status at discharge: Defer to PT note for details.

## 2019-11-21 NOTE — PROGRESS NOTES
Patient vital signs are at baseline: Yes ex on 1 L O2 overnight  Patient able to ambulate as they were prior to admission or with assist devices provided by therapies during their stay:  Yes  Patient MUST void prior to discharge:  Yes  Patient able to tolerate oral intake:yes   Pain has adequate pain control using Oral analgesics:  yes

## 2024-08-20 NOTE — PROGRESS NOTES
06/19/19 1041   Quick Adds   Type of Visit Initial Occupational Therapy Evaluation   Living Environment   Lives With spouse   Living Arrangements house   Transportation Anticipated car, drives self;family or friend will provide   Living Environment Comment pt reports living in 2 story house with , older sons and will also have support from her mom as needed during initial recovery   Self-Care   Usual Activity Tolerance good   Current Activity Tolerance fair   Regular Exercise No   Equipment Currently Used at Home none   Functional Level   Ambulation 0-->independent   Transferring 0-->independent   Toileting 0-->independent  (standard height toilet)   Bathing 0-->independent  (step in shower vs tub shower)   Dressing 0-->independent   Fall history within last six months no   Prior Functional Level Comment independent at baseline   General Information   Onset of Illness/Injury or Date of Surgery - Date 06/18/19   Referring Physician Wilton BHAGAT   Patient/Family Goals Statement return home   Additional Occupational Profile Info/Pertinent History of Current Problem pt seen for OT evaluation POD#1 R TKA   Precautions/Limitations fall precautions   Weight-Bearing Status - RLE weight-bearing as tolerated   Cognitive Status Examination   Orientation orientation to person, place and time   Level of Consciousness alert   Cognitive Comment pt at times impulsive, receptive to instructions for improved safety   Pain Assessment   Patient Currently in Pain Yes, see Vital Sign flowsheet   Mobility   Bed Mobility Comments not assessed   Transfer Skills   Transfer Comments FWW, SBA, verbal cues   Transfer Skill: Sit to Stand   Level of [place]: Sit/Stand stand-by assist   Physical Assist/Nonphysical Assist: Sit/Stand verbal cues   Transfer Skill: Sit to Stand weight-bearing as tolerated   Assistive Device for Transfer: Sit/Stand rolling walker   Balance   Balance Comments impaired post-op   Lower Body Dressing   Level    [facility]    Outpatient Pediatric Sleep Medicine Consultation        Name: Ashley Goodwin MRN# [id number]   Age: 6 year old YOB: 2018     Date of Consultation: Aug 20, 2024  Consultation is requested by: Jesus Castellon MD  [address] [place] [address]  Primary care provider: Jesus Castellon was asked by Jesus Castellon MD to consult on Ashley Goodwin in the pediatric sleep clinic regarding restless sleep and middle of the night waking.        Reason for Sleep Consult:    Restless sleep and middle of the night waking         History of Present Illness:     Ashley Goodwin is an otherwise healthy 6 year old female accompanied by mother with a history of restless sleep and middle of the night waking. Symptoms have been present for Ashley's entire life and are stable.     Sleep/wake patterns:  Currently, Ashley usually goes to bed at 8/8:30 pm on weeknights and on weekend nights. Ashley usually falls asleep after 30 minutes and sometimes needs to have mom present to fall asleep. Most nights Ashley will need a body pillow or very soft blanket to be able to fall asleep. Once asleep, she wakes up in the night 4-5 times a week. Ashley will often go to mom's room but sometimes does not make it to mom's bed and will fall asleep on the floor next to mom's bed. These wake ups happen mostly during the weekdays and not on the weekends. Ashley usually wakes up at 7 am on weekdays and 8:30/9 am on weekends. Ashley wakes without difficulty and seems to be in a good mood upon waking. When Ashley was attending  up until just a week ago, she would nap the 3 days she was there. Currently she does not nap regularly and sometimes will fall asleep in the car if she is very tired.      Additional sleep history:   Snoring is not reported. There are no obvious pauses in respiration heard during sleep. There are no obvious gasping and snorting  "of [place]: Dress Lower Body stand-by assist   Physical Assist/Nonphysical Assist: Dress Lower Body verbal cues   Toileting   Level of [place]: Toilet stand-by assist   Physical Assist/Nonphysical Assist: Toilet verbal cues   Instrumental Activities of Daily Living (IADL)   Previous Responsibilities meal prep;housekeeping;laundry;shopping;finances;driving   IADL Comments pt states will have support as needed from multiple family members   Activities of Daily Living Analysis   Impairments Contributing to Impaired Activities of Daily Living balance impaired;pain;strength decreased   General Therapy Interventions   Planned Therapy Interventions ADL retraining;IADL retraining;transfer training   Clinical Impression   Criteria for Skilled Therapeutic Interventions Met yes, treatment indicated   OT Diagnosis impaired ability to manage ADL/IADLs   Influenced by the following impairments post-op pain, fatigue, weakness   Assessment of Occupational Performance 3-5 Performance Deficits   Identified Performance Deficits dressing, bathing, toileting, housekeeping tasks   Clinical Decision Making (Complexity) Low complexity   Therapy Frequency Daily   Predicted Duration of Therapy Intervention (days/wks) 2 days   Anticipated Discharge Disposition Home with Assist   Risks and Benefits of Treatment have been explained. Yes   Patient, Family & other staff in agreement with plan of care Yes   Clinical Impression Comments would benefit from skilled OT Services   [facility] TM \"6 Clicks\"   2016, Trustees of [facility], under license to [employer].  All rights reserved.   6 Clicks Short Forms Daily Activity Inpatient Short Form   [facility]  \"6 Clicks\" Daily Activity Inpatient Short Form   1. Putting on and taking off regular lower body clothing? 3 - A Little   2. Bathing (including washing, rinsing, drying)? 3 - A Little   3. Toileting, which includes using toilet, bedpan or urinal? 3 - A " sounds heard during sleep. Excessive daytime sleepiness is not a concern. Ashley sleeps in his own bed most nights in her own room. As noted above, she will sometimes go to mom's bed in the middle of the night and wake there. Ashley has very restless sleep and moves around a lot in her sleep per mom. This has been noted whenever Ashley shares the bed with mom.     Additional sleep symptoms: None   Pertinent negatives: sleep talking, nightmares, leg discomfort, night terrors, sleep walking, insomnia    Daytime dysfunction:  Daytime symptoms: No concerns. Ashley is awake, alert and has age appropriate behavior during the day.   Naps: none currently  The child will start in  in the Fall. She has been in  3 days a week prior to 1 week ago when  ended. She did well in this setting with no concerns.          Medications:     Current Outpatient Medications   Medication Sig Dispense Refill    cetirizine (ZYRTEC) 5 MG/5ML solution Take 2.5 mLs (2.5 mg) by mouth daily as needed for other (itching) 120 mL 1    ferrous sulfate (SHERRIE-IN-SOL) 75 (15 FE) MG/ML oral drops Take 4.33 mLs (65 mg) by mouth daily 140 mL 5    fluocinolone acetonide (DERMA SMOOTHE/FS BODY) 0.01 % external oil Apply sparingly to affected area two times daily as needed for up to 4 weeks at a time 120 mL 1    albuterol (PROVENTIL) (2.5 MG/3ML) 0.083% neb solution Take 1 vial (2.5 mg) by nebulization every 4 hours as needed for shortness of breath, wheezing or cough 90 mL 0     No current facility-administered medications for this visit.      No Known Allergies         Past Medical History:   Does not need 02 supplement at night   Past Medical History:   Diagnosis Date    Accessory nipple in female 2019    left      Intrinsic atopic dermatitis 09/16/2020    Umbilical hernia without obstruction and without gangrene 2019           Past Surgical History:    No h/o upper airway surgery  No past surgical history on file.         Social  "History:     Social History     Tobacco Use    Smoking status: Never    Smokeless tobacco: Never   Substance Use Topics    Alcohol use: Not on file     Psych Hx:   No behavioral concerns at this time.   Current dangers to self or others:none         Family History:     Family History   Problem Relation Age of Onset    Asthma Mother     Allergies Mother     Asthma Father     Allergies Father     Thyroid Disease Maternal Grandmother     Hypertension Maternal Grandmother     Asthma Brother     Allergies Brother       Sleep Family Hx:        RLS- n/a   USHA - n/a  Insomnia - n/a  Parasomnia - n/a         Review of Systems:   Review of Systems - A complete 10 point review of systems was negative other than HPI as above.          Physical Examination:   /75 (BP Location: Right arm, Patient Position: Sitting, Cuff Size: Child)   Pulse 102   Temp 98.8  F (37.1  C)   Resp 22   Ht 3' 5.73\" (106 cm)   Wt 45 lb 13.7 oz (20.8 kg)   SpO2 99%   BMI 18.51 kg/m       Constitutional:  No distress, comfortable, pleasant.  Vital signs:  Reviewed and normal.  Ears, Nose and Throat:  Ear exam deferred, nose clear and free of lesions, throat clear.  Cardiovascular:   Regular rate and rhythm, no murmurs, rubs or gallops, peripheral pulses full and symmetric.  Chest:  Symmetrical, no retractions.  Respiratory:  Clear to auscultation, no wheezes or crackles, normal breath sounds.  Psychological:  Appropriate mood.         Data: All pertinent previous laboratory data reviewed     No results found for: \"PH\", \"PHARTERIAL\", \"PO2\", \"MP8LXXFXEAG\", \"SAT\", \"PCO2\", \"HCO3\", \"BASEEXCESS\", \"FREDY\", \"BEB\"  No results found for: \"TSH\"  No results found for: \"GLC\"  Lab Results   Component Value Date    HGB 12.8 12/06/2023    HGB Specimen not received 09/16/2020     No results found for: \"BUN\", \"CR\"  No results found for: \"AST\", \"ALT\", \"GGT\", \"ALKPHOS\", \"BILITOTAL\", \"BILICONJ\", \"BILIDIRECT\", \"GISEL\"  Ferritin   Date Value Ref Range Status " Little   4. Putting on and taking off regular upper body clothing? 4 - None   5. Taking care of personal grooming such as brushing teeth? 4 - None   6. Eating meals? 4 - None   Daily Activity Raw Score (Score out of 24.Lower scores equate to lower levels of function) 21   Total Evaluation Time   Total Evaluation Time (Minutes) 8        08/20/2024 67 8 - 115 ng/mL Final          Assessment and Plan:     Summary Sleep Diagnoses:    Ashley Goodwin is an otherwise healthy 6 year old female with a history of restless sleep and middle of the night waking. We will check a ferritin level today in clinic and start an iron supplement if indicated. Additionally, we discussed sleep hygiene strategies to help with middle of the night wakings including having Ashley associate sleep with something other than having a parent present.     Summary Recommendations:    Orders Placed This Encounter   Procedures    Ferritin     Patient Instructions   We recommend checking a ferritin level today to screen for restless legs syndrome. If this is below 50-75 we will start an iron supplement.(Results will be communicated via [employer].)  We recommend working on letting Ashley fall asleep on her own every night to help decrease how often she comes to mom's bedroom in the night. Tips for this are listed below.  Follow up in 3-4 months for routine care.     Please call the pediatric pulmonary/CF triage line at [phone] with questions, concerns and prescription refill requests during business hours. Please call [phone] for scheduling. For urgent concerns after hours and on the weekends, please contact the on call pulmonologist ([phone]).         Nightwakings  Nightwakings in young children is one of the most common problems that parents face. By 6 months of age, most babies are physiologically capable of sleeping throughout the night and no longer require nighttime feedings. However, 25% to 50% continue to awaken during the night. Nightwaking problems can occur at any age but are most common with infants and toddlers.  WHY DOES YOUR CHILD WAKE DURING THE NIGHT?  When it comes to nightwaking, the most important thing for parents to understand is that all children, no matter the age, wake briefly throughout the night. These arousals occur between 2 to 6 times per night.  So the problem is rarely the waking during the night but rather why the child is unable to return to sleep on her own. Children who are able to soothe themselves back to sleep ( self-soothers ) awaken briefly throughout the night, but their parents are unaware of these arousals. In contrast,  signalers  are those children who alert their parents by crying or going into the parents  bedroom upon awakening. Many of these    children have developed inappropriate sleep-onset associations and, thus, have difficulty self-soothing.  WHAT ARE SLEEP ASSOCIATIONS?  Many parents develop the habit of helping their child to fall asleep by rocking, holding, or bringing the child into bed with them. Over time, children may learn to rely on this kind of help from their parents in order to fall asleep. Although this may not be a problem at bedtime, it may lead to difficulties with your child failing back to sleep on her own during the night. Thus, sleep associations are conditions that the child learns to need in order to fall asleep at bedtime (such as rocking, nursing, or lying next to a parent). These same sleep associations are then needed in order to fall back to sleep during the night. The bottom line is that your child needs to learn to fall asleep on her own, so that she can put herself immediately back to sleep when she awakens. Studies clearly show that infants and toddlers who fall asleep independently fall asleep faster, wake less often at night, and get over 1 hour more sleep.  WHAT CAN YOU DO TO HELP YOUR CHILD SLEEP THROUGH THE NIGHT?  There are a number of steps that you can take to help your child sleep through the night.  n Develop an appropriate sleep schedule with an early bedtime: Ironically, the more tired your child is, the more times she will awaken during the night. So be sure to have your child continue to take naps during the day and set an early bedtime.  n Security object: Try to introduce your  child to a transitional or love object. A transitional object, like a stuffed toy, doll, or blanket, helps a child feel safe and secure when you are not present. Help your child become attached to a transitional object by including it as part of the bedtime routine. Try to include this object whenever you are cuddling or comforting your child. Don t force your child to accept the object, and realize that some children will not accept one no matter how cute and cuddly the object.  n Bedtime routine: Establish a consistent bedtime routine that includes calm and enjoyable activities, such as a bath and bedtime stories. Avoid exciting high-energy activities, such as playing outside, running around, or watching television shows or videos. The activities occurring closest to  lights out  should occur in the room where your child sleeps. Also, avoid making bedtime feedings part of the bedtime routine after 6 months.  n Consistent bedroom environment: Make sure your child s bedroom environment is the same at bedtime as it is throughout the night (e.g., lighting).  n Put your child to bed drowsy but awake: After the bedtime routine, put your child in her crib or bed drowsy but awake and leave the room. Remember, the key to having your child sleep through the night is to have her learn to fall asleep on her own, so she can put herself back to sleep when she naturally awakens during the night. Make sure your child is more awake than drowsy. Some children need to be wide awake to really learn how to fall asleep on their own.  n Checking method: If your child cries or yells, check on her. Wait for as long or as short a time as you wish. For some children, frequent checking is effective; for others, infrequent checking works best. Continue returning to check on your child as long as she is crying or upset. The visits should be brief (1 minute) and nonstimulating. Calmly tell your child it s time to go to sleep. The purpose of  returning to the room is to reassure your child that you are still present and to reassure yourself that your child is okay.  n Respond to your child during the night: In the beginning, respond to your child as you normally do throughout the night (e.g., nurse, rock). Research indicates that the majority of children will naturally begin sleeping through the night within 1 to 2 weeks of falling asleep quickly and easily at bedtime. If your child continues to awaken during the night after several weeks, then use the same checking method during the night as you did at bedtime.  n A more gradual approach: Some parents feel that not being present when their child falls asleep feels like too big of a first step for them and their child. A more gradual approach is to teach your child to fall asleep on her own but with you in the room. This approach will take longer but feels more comfortable to some families. The first step is to put your child in her crib or bed awake and sit on a chair next to it. Once she is able to consistently fall asleep this way, sit farther and farther away every 3 to 4 nights until you are finally in the hallway and no longer in sight. Some parents find it easier to pretend that they are asleep rather than sitting in a chair.  n Be consistent and don t give up: The first few nights are likely to be very challenging and often the second or third night is worse than the first night. However, within a few nights to a week, you will begin to see improvement.  2 Nightwakings    Debbie STREETER & Simon STREETER (2010). A Clinical Guide to Pediatric Sleep: Diagnosis and Management of Sleep Problems, 2nd ed. [place]: Milana Asher & Silva        Summary Counseling:  See instructions    We appreciate the opportunity to be involved in [facility] health care. If there are any additional questions or concerns regarding this evaluation, please do not hesitate to contact us at any time.       Claribel Watkins,  APRN, CNP  [facility]  Pediatric Pulmonary  Telephone: [phone]      Ordering of each unique test  60 minutes spent by me on the date of the encounter doing chart review, history and exam, documentation and further activities per the note              CC  IVONNE DOZIER    Copy to patient  MJ SARMIENTO   [address] [facility] [id number]

## (undated) DEVICE — SU STRATAFIX PDS PLUS 0 CT 45CM SXPP1A406

## (undated) DEVICE — SUCTION MANIFOLD NEPTUNE 2 SYS 4 PORT 0702-020-000

## (undated) DEVICE — SU VICRYL 0 CP-1 27" J467H

## (undated) DEVICE — PACK TOTAL KNEE SOP15TKFSD

## (undated) DEVICE — DRSG KERLIX FLUFFS X5

## (undated) DEVICE — BLADE SAW SAGITTAL STRK 19.5X90X1.20MM 2108-109-000S17

## (undated) DEVICE — BNDG ELASTIC 6" DBL LENGTH UNSTERILE 6611-16

## (undated) DEVICE — SU DERMABOND PRINEO 22CM CLR222US

## (undated) DEVICE — SOL BENZOIN 0.5OZ

## (undated) DEVICE — BLADE SAW RECIP STRK LONG 70X12.5X0.9MM 0277-096-278

## (undated) DEVICE — CATH TRAY FOLEY SURESTEP 16FR DRAIN BAG STATOCK A899916

## (undated) DEVICE — GLOVE PROTEXIS W/NEU-THERA 8.0  2D73TE80

## (undated) DEVICE — TOURNIQUET CUFF 30" REPRO BLUE 60-7070-105

## (undated) DEVICE — DRAPE IOBAN INCISE 23X17" 6650EZ

## (undated) DEVICE — NDL BLUNT 18GA 1.5" W/O FILTER 305180

## (undated) DEVICE — SET HANDPIECE INTERPULSE W/COAXIAL FAN SPRAY TIP 0210118000

## (undated) DEVICE — SUCTION IRR SYSTEM W/O TIP INTERPULSE HANDPIECE 0210-100-000

## (undated) DEVICE — SOL WATER IRRIG 1000ML BOTTLE 2F7114

## (undated) DEVICE — GLOVE PROTEXIS BLUE W/NEU-THERA 8.0  2D73EB80

## (undated) DEVICE — Device

## (undated) DEVICE — WRAP EZY KNEE

## (undated) DEVICE — LINEN HALF SHEET 5512

## (undated) DEVICE — CAST PADDING 6" UNSTERILE 9046

## (undated) DEVICE — PACK TOTAL KNEE BOXED LATEX FREE PO15TKFCT

## (undated) DEVICE — SU MONOCRYL 3-0 PS-2 27" Y427H

## (undated) DEVICE — SU ETHIBOND 0 CT-1 CR 8X18" CX21D

## (undated) DEVICE — BNDG COBAN 6"X5YDS STERILE

## (undated) DEVICE — BLADE SAW SAGITTAL STRK 19.5X90X1.27MM 2108-109-000S11

## (undated) DEVICE — DRSG GAUZE 4X4" 3033

## (undated) DEVICE — SYR 50ML LL W/O NDL 309653

## (undated) DEVICE — GOWN IMPERVIOUS SPECIALTY XL/XLONG 39049

## (undated) DEVICE — SU ETHIBOND 0 CTX CR  8X18" CX31D

## (undated) DEVICE — BONE CEMENT MIXEVAC III HI VAC KIT  0206-015-000

## (undated) DEVICE — SU VICRYL 2-0 CP-1 27" J466H

## (undated) DEVICE — BLADE SAW SAGITTAL STRK 13X90X1.27MM HD SYS 6 6113-127-090

## (undated) DEVICE — SYR 50ML CATH TIP W/O NDL 309620

## (undated) DEVICE — SU STRATAFIX PDS PLUS 0 CT-1 18" SXPP1A401

## (undated) DEVICE — IMM KNEE 20" 0814-2660

## (undated) DEVICE — GLOVE PROTEXIS POWDER FREE 8.0 ORTHOPEDIC 2D73ET80

## (undated) DEVICE — SOLUTION WOUND CLEANSING 3/4OZ 10% PVP EA-L3011FB-50

## (undated) DEVICE — DRSG ADAPTIC 3X8" 6113

## (undated) DEVICE — DRAIN HEMOVAC RESERVOIR KIT 10FR 1/8" MED 00-2550-002-10

## (undated) DEVICE — LINEN ORTHO ACL PACK 5447

## (undated) DEVICE — MANIFOLD NEPTUNE 4 PORT 700-20

## (undated) DEVICE — DRSG ABDOMINAL 07 1/2X8" 7197D

## (undated) DEVICE — IMM KNEE 20" WIDE 08142671

## (undated) DEVICE — NDL SPINAL 18GA 3.5" 405184

## (undated) DEVICE — DRAIN ROUND W/RESERV KIT JACKSON PRATT 10FR 400ML SU130-402D

## (undated) DEVICE — LINEN FULL SHEET 5511

## (undated) DEVICE — CAST PADDING 4" UNSTERILE 9044

## (undated) DEVICE — SU VICRYL 2-0 CP-1 27" UND J266H

## (undated) DEVICE — BAG CLEAR TRASH 1.3M 39X33" P4040C

## (undated) DEVICE — ESU GROUND PAD UNIVERSAL W/O CORD

## (undated) DEVICE — PREP CHLORAPREP 26ML TINTED ORANGE  260815

## (undated) DEVICE — BLADE CLIPPER 4412A

## (undated) DEVICE — DRAPE STERI TOWEL LG 1010

## (undated) DEVICE — CAST PADDING 6" STERILE 9046S

## (undated) DEVICE — LINEN TOWEL PACK X5 5464

## (undated) DEVICE — GLOVE PROTEXIS MICRO 8.0  2D73PM80

## (undated) DEVICE — DRSG STERI STRIP 1/2X4" R1547

## (undated) DEVICE — DRSG GAUZE 4X4" 8044

## (undated) DEVICE — BLADE SAW RECIP STRK 70X12.5X1.2MM 0277-096-281

## (undated) DEVICE — BONE CLEANING TIP INTERPULSE  0210-010-000

## (undated) RX ORDER — ROPIVACAINE HYDROCHLORIDE 2 MG/ML
INJECTION, SOLUTION EPIDURAL; INFILTRATION; PERINEURAL
Status: DISPENSED
Start: 2019-11-20

## (undated) RX ORDER — PROPOFOL 10 MG/ML
INJECTION, EMULSION INTRAVENOUS
Status: DISPENSED
Start: 2019-11-20

## (undated) RX ORDER — ONDANSETRON 2 MG/ML
INJECTION INTRAMUSCULAR; INTRAVENOUS
Status: DISPENSED
Start: 2019-06-18

## (undated) RX ORDER — FENTANYL CITRATE 50 UG/ML
INJECTION, SOLUTION INTRAMUSCULAR; INTRAVENOUS
Status: DISPENSED
Start: 2019-06-18

## (undated) RX ORDER — LIDOCAINE HYDROCHLORIDE 20 MG/ML
INJECTION, SOLUTION EPIDURAL; INFILTRATION; INTRACAUDAL; PERINEURAL
Status: DISPENSED
Start: 2019-11-20

## (undated) RX ORDER — FENTANYL CITRATE 50 UG/ML
INJECTION, SOLUTION INTRAMUSCULAR; INTRAVENOUS
Status: DISPENSED
Start: 2019-11-20

## (undated) RX ORDER — PROPOFOL 10 MG/ML
INJECTION, EMULSION INTRAVENOUS
Status: DISPENSED
Start: 2019-06-18

## (undated) RX ORDER — DEXAMETHASONE SODIUM PHOSPHATE 4 MG/ML
INJECTION, SOLUTION INTRA-ARTICULAR; INTRALESIONAL; INTRAMUSCULAR; INTRAVENOUS; SOFT TISSUE
Status: DISPENSED
Start: 2019-11-20

## (undated) RX ORDER — CEFAZOLIN SODIUM IN 0.9 % NACL 3 G/100 ML
INTRAVENOUS SOLUTION, PIGGYBACK (ML) INTRAVENOUS
Status: DISPENSED
Start: 2019-06-18

## (undated) RX ORDER — GLYCOPYRROLATE 0.2 MG/ML
INJECTION INTRAMUSCULAR; INTRAVENOUS
Status: DISPENSED
Start: 2019-06-18

## (undated) RX ORDER — KETOROLAC TROMETHAMINE 30 MG/ML
INJECTION, SOLUTION INTRAMUSCULAR; INTRAVENOUS
Status: DISPENSED
Start: 2019-06-18

## (undated) RX ORDER — LIDOCAINE HYDROCHLORIDE 10 MG/ML
INJECTION, SOLUTION EPIDURAL; INFILTRATION; INTRACAUDAL; PERINEURAL
Status: DISPENSED
Start: 2019-06-18

## (undated) RX ORDER — KETOROLAC TROMETHAMINE 30 MG/ML
INJECTION, SOLUTION INTRAMUSCULAR; INTRAVENOUS
Status: DISPENSED
Start: 2019-11-20

## (undated) RX ORDER — ROPIVACAINE HYDROCHLORIDE 7.5 MG/ML
INJECTION, SOLUTION EPIDURAL; PERINEURAL
Status: DISPENSED
Start: 2019-06-18

## (undated) RX ORDER — HYDROMORPHONE HYDROCHLORIDE 1 MG/ML
INJECTION, SOLUTION INTRAMUSCULAR; INTRAVENOUS; SUBCUTANEOUS
Status: DISPENSED
Start: 2019-11-20

## (undated) RX ORDER — PHENYLEPHRINE HCL IN 0.9% NACL 1 MG/10 ML
SYRINGE (ML) INTRAVENOUS
Status: DISPENSED
Start: 2019-06-18

## (undated) RX ORDER — ONDANSETRON 2 MG/ML
INJECTION INTRAMUSCULAR; INTRAVENOUS
Status: DISPENSED
Start: 2019-11-20

## (undated) RX ORDER — CEFAZOLIN SODIUM 1 G/3ML
INJECTION, POWDER, FOR SOLUTION INTRAMUSCULAR; INTRAVENOUS
Status: DISPENSED
Start: 2019-11-20

## (undated) RX ORDER — CEFAZOLIN SODIUM 1 G/3ML
INJECTION, POWDER, FOR SOLUTION INTRAMUSCULAR; INTRAVENOUS
Status: DISPENSED
Start: 2019-06-18

## (undated) RX ORDER — DEXAMETHASONE SODIUM PHOSPHATE 4 MG/ML
INJECTION, SOLUTION INTRA-ARTICULAR; INTRALESIONAL; INTRAMUSCULAR; INTRAVENOUS; SOFT TISSUE
Status: DISPENSED
Start: 2019-06-18